# Patient Record
Sex: FEMALE | Race: WHITE | NOT HISPANIC OR LATINO | Employment: STUDENT | URBAN - METROPOLITAN AREA
[De-identification: names, ages, dates, MRNs, and addresses within clinical notes are randomized per-mention and may not be internally consistent; named-entity substitution may affect disease eponyms.]

---

## 2023-02-01 ENCOUNTER — OFFICE VISIT (OUTPATIENT)
Dept: INTERNAL MEDICINE CLINIC | Facility: CLINIC | Age: 23
End: 2023-02-01

## 2023-02-01 VITALS
SYSTOLIC BLOOD PRESSURE: 118 MMHG | TEMPERATURE: 96.1 F | BODY MASS INDEX: 21.28 KG/M2 | WEIGHT: 132.4 LBS | OXYGEN SATURATION: 94 % | HEIGHT: 66 IN | HEART RATE: 59 BPM | DIASTOLIC BLOOD PRESSURE: 70 MMHG

## 2023-02-01 DIAGNOSIS — N91.1 SECONDARY AMENORRHEA: Primary | ICD-10-CM

## 2023-02-01 DIAGNOSIS — K90.41 GLUTEN INTOLERANCE: ICD-10-CM

## 2023-02-01 DIAGNOSIS — L70.9 ACNE, UNSPECIFIED ACNE TYPE: ICD-10-CM

## 2023-02-01 PROBLEM — N91.2 AMENORRHEA: Status: ACTIVE | Noted: 2018-07-01

## 2023-02-01 NOTE — PROGRESS NOTES
Assessment/Plan:    Problem List Items Addressed This Visit        Musculoskeletal and Integument    Acne     -previously treated with spironolactone  -consider topical agents in the future            Other    Secondary amenorrhea - Primary     -menarche at 16yo, irregular and very few periods since with LMP 2019   -reports prior workup was unrevealing, no records in her chart  -will repeat labs, obtain pelvic US  -refer to GYN  Relevant Orders    TSH, 3rd generation with Free T4 reflex    Prolactin    FSH and LH    Comprehensive metabolic panel    CBC    US pelvis complete non OB    Ambulatory Referral to Obstetrics / Gynecology    Gluten intolerance     -doing well with following gluten free diet  -pt defers diagnosis by biopsy at this time  -she is also deferring lab testing  -readdress in the future            Subjective:      Patient ID: Norberto Closs is a 25 y o  female  HPI  20yo female here as a new patient  She has been following a gluten free diet but not formally diagnosed with celiac  Other sister's have had gluten intolerance as well  Has had irregular periods  Menarche at 16yo, had one period once then again in 2016  Was regular for 3-5 months in 2018  Since then, had her period once in 2019, LMP was then  She was a , she is training for a marathon right now  Works out 1hr per day  She was tested in 2020 and was noted to be anemic and low in proteins which normalized on repeat testing  She denies energy changes, mood lability, no vaginal discharge  She is not sexually active  She is not up to date on PAP  Has FH thyroid disorder and gluten intolerance  Has cold intolerance  She was placed on spironolactone for acne but discontinued 1 5 years ago      The following portions of the patient's history were reviewed and updated as appropriate: allergies, current medications, past family history, past medical history, past social history, past surgical history and problem list       Current Outpatient Medications:   •  ASHWAGANDHA PO, Take by mouth in the morning, Disp: , Rfl:   •  Multiple Vitamins-Minerals (WOMENS MULTI PO), Take by mouth 4 (four) times a day, Disp: , Rfl:     Review of Systems   Constitutional: Negative for activity change, appetite change, fatigue and unexpected weight change  Gastrointestinal: Negative for abdominal pain, diarrhea and nausea  Endocrine: Positive for cold intolerance  Genitourinary: Positive for menstrual problem  Negative for difficulty urinating, pelvic pain, vaginal bleeding, vaginal discharge and vaginal pain  Skin: Negative for color change, rash and wound  Psychiatric/Behavioral: Negative for dysphoric mood  Objective:    /70 (BP Location: Left arm, Patient Position: Sitting)   Pulse 59   Temp (!) 96 1 °F (35 6 °C) (Tympanic)   Ht 5' 6" (1 676 m)   Wt 60 1 kg (132 lb 6 4 oz)   SpO2 94%   BMI 21 37 kg/m²      Physical Exam  Vitals reviewed  Constitutional:       General: She is not in acute distress  Appearance: She is normal weight  Neck:      Thyroid: No thyromegaly or thyroid tenderness  Cardiovascular:      Rate and Rhythm: Normal rate and regular rhythm  Pulses: Normal pulses  Heart sounds: Normal heart sounds  Pulmonary:      Effort: Pulmonary effort is normal  No respiratory distress  Breath sounds: Normal breath sounds  No wheezing  Abdominal:      General: Abdomen is flat  Bowel sounds are normal       Palpations: Abdomen is soft  Musculoskeletal:      Cervical back: Neck supple  Right lower leg: No edema  Left lower leg: No edema  Lymphadenopathy:      Cervical: No cervical adenopathy  Skin:     Coloration: Skin is not pale  Neurological:      Mental Status: She is alert and oriented to person, place, and time     Psychiatric:         Mood and Affect: Mood normal

## 2023-02-01 NOTE — ASSESSMENT & PLAN NOTE
-menarche at 16yo, irregular and very few periods since with LMP 2019   -reports prior workup was unrevealing, no records in her chart  -will repeat labs, obtain pelvic US  -refer to GYN

## 2023-02-01 NOTE — ASSESSMENT & PLAN NOTE
-doing well with following gluten free diet  -pt defers diagnosis by biopsy at this time  -she is also deferring lab testing  -readdress in the future

## 2023-02-05 LAB
ALBUMIN SERPL-MCNC: 4.6 G/DL (ref 3.6–5.1)
ALBUMIN/GLOB SERPL: 1.9 (CALC) (ref 1–2.5)
ALP SERPL-CCNC: 52 U/L (ref 31–125)
ALT SERPL-CCNC: 28 U/L (ref 6–29)
AST SERPL-CCNC: 50 U/L (ref 10–30)
BASOPHILS # BLD AUTO: 40 CELLS/UL (ref 0–200)
BASOPHILS NFR BLD AUTO: 1.3 %
BILIRUB SERPL-MCNC: 1.3 MG/DL (ref 0.2–1.2)
BUN SERPL-MCNC: 15 MG/DL (ref 7–25)
BUN/CREAT SERPL: ABNORMAL (CALC) (ref 6–22)
CALCIUM SERPL-MCNC: 9.5 MG/DL (ref 8.6–10.2)
CHLORIDE SERPL-SCNC: 103 MMOL/L (ref 98–110)
CO2 SERPL-SCNC: 30 MMOL/L (ref 20–32)
CREAT SERPL-MCNC: 0.79 MG/DL (ref 0.5–0.96)
EOSINOPHIL # BLD AUTO: 59 CELLS/UL (ref 15–500)
EOSINOPHIL NFR BLD AUTO: 1.9 %
ERYTHROCYTE [DISTWIDTH] IN BLOOD BY AUTOMATED COUNT: 11.6 % (ref 11–15)
FSH SERPL-ACNC: 5.8 MIU/ML
GFR/BSA.PRED SERPLBLD CYS-BASED-ARV: 108 ML/MIN/1.73M2
GLOBULIN SER CALC-MCNC: 2.4 G/DL (CALC) (ref 1.9–3.7)
GLUCOSE SERPL-MCNC: 81 MG/DL (ref 65–99)
HCT VFR BLD AUTO: 38 % (ref 35–45)
HGB BLD-MCNC: 13.5 G/DL (ref 11.7–15.5)
LH SERPL-ACNC: 2.3 MIU/ML
LYMPHOCYTES # BLD AUTO: 1048 CELLS/UL (ref 850–3900)
LYMPHOCYTES NFR BLD AUTO: 33.8 %
MCH RBC QN AUTO: 31.8 PG (ref 27–33)
MCHC RBC AUTO-ENTMCNC: 35.5 G/DL (ref 32–36)
MCV RBC AUTO: 89.6 FL (ref 80–100)
MONOCYTES # BLD AUTO: 270 CELLS/UL (ref 200–950)
MONOCYTES NFR BLD AUTO: 8.7 %
NEUTROPHILS # BLD AUTO: 1683 CELLS/UL (ref 1500–7800)
NEUTROPHILS NFR BLD AUTO: 54.3 %
PLATELET # BLD AUTO: 244 THOUSAND/UL (ref 140–400)
PMV BLD REES-ECKER: 10.3 FL (ref 7.5–12.5)
POTASSIUM SERPL-SCNC: 4.4 MMOL/L (ref 3.5–5.3)
PROLACTIN SERPL-MCNC: 5.6 NG/ML
PROT SERPL-MCNC: 7 G/DL (ref 6.1–8.1)
RBC # BLD AUTO: 4.24 MILLION/UL (ref 3.8–5.1)
SODIUM SERPL-SCNC: 140 MMOL/L (ref 135–146)
TSH SERPL-ACNC: 1.9 MIU/L
WBC # BLD AUTO: 3.1 THOUSAND/UL (ref 3.8–10.8)

## 2023-02-20 ENCOUNTER — HOSPITAL ENCOUNTER (OUTPATIENT)
Dept: RADIOLOGY | Facility: HOSPITAL | Age: 23
Discharge: HOME | End: 2023-02-20

## 2023-02-20 DIAGNOSIS — N91.1 SECONDARY AMENORRHEA: ICD-10-CM

## 2023-02-22 NOTE — PROGRESS NOTES
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  We discussed starting OCPs for menstrual regulation  Patient is not interested in taking any medications at this time  We will follow-up once blood work is returned and ultrasound has been resulted  Schedule for annual exam    Subjective    CC: Problem visit     Samson Strong is a 25 y o  female [address] for  irregular periods  Menarche at 15yo,with one cycle  She did not have another cycle again until  2016  Reports regular cycles  for 3-5 months in 2018  Since then, she had only one cycle in 2019, lasting 4-5 days typically, that was her last documented cycle  She was a  played soccer, she is training for a marathon right now  Runs daily 6 miles 3 x a week, and runs longer 4 time a week,, bike rides 1 x weekly    She was tested for amenorrhea in 2020 and was noted to be anemic which normalized on repeat testing  She denies pelvic pain, unusual vaginal discharge or odors  She is not sexually active  She has not had her 1st pap     Hx of Spironolactone for acne but discontinued 1 5 years ago  No LMP recorded  Past Medical History:   Diagnosis Date   • Allergic 2005   • Irregular periods 6/29/2015   • Personal history of multiple concussions     x2   • Sprained ankle      Past Surgical History:   Procedure Laterality Date   • ROOT CANAL     • WISDOM TOOTH EXTRACTION           There is no immunization history on file for this patient      Family History   Problem Relation Age of Onset   • Depression Mother    • Thyroid disease Mother    • Autoimmune disease Mother         breaks out in hives occasionally after stressful event   • Anxiety disorder Mother    • Diabetes Father         type 2   • Hypothyroidism Sister         Hashimotos   • Autoimmune disease Sister         potential gluten intolerance as well (never tested)   • Thyroid disease Maternal Grandmother    • Hearing loss Maternal Grandmother    • Prostate cancer Maternal Grandfather    • Stroke Paternal Grandmother    • Stroke Paternal Grandfather    • Stroke Paternal Uncle    • Breast cancer Neg Hx    • Colon cancer Neg Hx    • Ovarian cancer Neg Hx      Social History     Tobacco Use   • Smoking status: Never   • Smokeless tobacco: Never   Vaping Use   • Vaping Use: Never used   Substance Use Topics   • Alcohol use: Yes     Comment: not consistently; once every couple weeks   • Drug use: Never     No current outpatient medications on file  Patient Active Problem List    Diagnosis Date Noted   • Gluten intolerance 2023   • Secondary amenorrhea 2018   • Acne 2015   • Eczema 10/17/2014       Allergies   Allergen Reactions   • Dog Epithelium Allergy Skin Test Itching and Nasal Congestion   • Dust Mite Extract Allergic Rhinitis   • Gluten Meal - Food Allergy Diarrhea and GI Intolerance       OB History    Para Term  AB Living   0 0 0 0 0 0   SAB IAB Ectopic Multiple Live Births   0 0 0 0 0       Vitals:    23 1308   BP: 100/62   BP Location: Left arm   Patient Position: Sitting   Cuff Size: Standard   Weight: 59 9 kg (132 lb)   Height: 5' 6" (1 676 m)     Body mass index is 21 31 kg/m²  Review of Systems     Constitutional: Negative for chills, fatigue, fever, headaches, visual disturbances, and unexpected weight change  Respiratory: Negative for cough, & shortness of breath  Cardiovascular: Negative for chest pain       Gastrointestinal: Negative for Abd pain, nausea & vomiting, constipation and diarrhea     Genitourinary: Negative for difficulty urinating, dysuria, hematuria, dyspareunia, unusual vaginal bleeding or discharge  Skin: Negative skin changes    Physical Exam     Constitutional: Alert & Oriented x3, well-developed and well-nourished  No distress  HENT: Atraumatic, Normocephalic, Conjunctivae clear  Neck: Normal range of motion  Neck supple  No thyromegaly, mass, nodules or tenderness  Pulmonary: Effort normal    Abdominal: Soft   No tenderness or masses  Musculoskeletal: Normal ROM  Skin: Warm & Dry  Psychological: Normal mood, thought content, behavior & judgement

## 2023-02-22 NOTE — PATIENT INSTRUCTIONS
Amenorrhea   AMBULATORY CARE:   Amenorrhea  is the absence of menstruation (your monthly period)  Primary amenorrhea means your period did not start by age 12  This is usually because of a lack of reproductive organs, such as a uterus  Breasts and other signs of puberty that usually start to develop by age 15 do not develop  Secondary amenorrhea means you stopped having regular periods for at least 3 months or irregular periods for 6 months  Amenorrhea may be a sign of a serious medical problem that needs to be treated  Common symptoms include the following:   Hair growth on your face or over your breastbone, or bald patches    Acne    Pelvic pain    Headaches    Vision changes    Bruises or patches of dark skin    Call your doctor or gynecologist for any of the following: You notice changes in your menstrual cycle, such as periods that start and stop a few times  Your female child is 15, has not started menstruating, and is not developing signs of puberty  Your female child is 12 and has developed signs of puberty, but she has not started menstruating  You have questions or concerns about your condition or care  Treatment for amenorrhea  may include birth control pills to restart and regulate your periods  You may need medicines to treat medical conditions such as a thyroid or pituitary disorder, or PCOS  Surgery may fix a problem that is preventing blood from flowing through your vagina, or to remove a tumor  Prevent or manage amenorrhea:   Maintain a healthy weight  Low body weight, overweight, or obesity can all affect your period  Your healthcare provider can help you create a plan to reach and maintain a healthy weight  Eat healthy foods  Healthy foods include fruits, vegetables, whole-grain breads, low-fat dairy products, beans, lean meats, and fish  You may need to have more calcium and vitamin D if your amenorrhea is caused by low estrogen levels   Low estrogen can affect bone strength  Calcium and vitamin D work together to help build bone  Your healthcare provider or a dietitian can help you create a meal plan that has the right number of calories and nutrients for you  Exercise as directed  Exercise can help you build or maintain bone  Exercise can also help you lose weight if you are overweight  Ask your healthcare provider how much to exercise and which exercises are best for you  Do not exercise more than your healthcare provider recommends  Too much exercise can cause your period to stop  Keep a record of your monthly periods  Record the dates your period started and stopped  Also record any pain or other problems during your period  Manage stress  Try new ways to relax, such as deep breathing  Ask your healthcare provider for more information on stress management  Follow up with your doctor or gynecologist as directed:  Write down your questions so you remember to ask them during your visits  © Copyright [employer] 2022 Information is for End User's use only and may not be sold, redistributed or otherwise used for commercial purposes  The above information is an  only  It is not intended as medical advice for individual conditions or treatments  Talk to your doctor, nurse or pharmacist before following any medical regimen to see if it is safe and effective for you  Female Athlete Triad   WHAT YOU NEED TO KNOW:   What is the female athlete triad? The female athlete triad is a combination of 3 health problems  These health problems include disordered eating, loss of monthly period, and low bone density  The condition occurs when a female athlete does intense training or exercise and has a strict diet  What increases my risk for the female athlete triad?    Not eating enough, fasting for long periods of time, following a strict diet    An eating disorder such as anorexia or bulimia    Pressure from coaches or parents to be thin or lose weight    Lack of social support because your training schedule keeps you from your friends or family    Conditions including anxiety, depression, or obsessive compulsive disorder    What are the signs and symptoms of the female athlete triad? Irregular or no monthly periods    Dry, cracked skin, and thinning hair    Large amount of weight loss    Scars or callouses on your hands or knuckles caused by forcing yourself to throw up    Feeling more tired than usual    Frequent injuries such as stress fractures    Trouble concentrating or mood changes    How is the female athlete triad diagnosed? Your healthcare provider will ask you questions about your eating behavior, weight changes, exercise habits, monthly periods, and injuries  He or she may ask how you feel about yourself and about the way you look  You may also need the following:  Blood and urine tests  may show low amount of hormones or electrolytes in your body  A bone density scan  will show if you have weak or thinning bones  An EKG  will show your heart rhythm  How is the female athlete triad treated? A dietitian  can help you with a meal plan and proper nutrition  He or she can discuss healthy eating habits and how to help you balance food and your sport  You may need to take extra vitamins or minerals  Medicines  may be needed to make your bones stronger, treat an abnormal heartbeat, or treat depression  Cognitive behavioral therapy (CBT)  will help you identify any negative feelings and behaviors about food and your weight  You and a therapist work together to learn the reasons you are unhappy with your body  Talk therapy  is a type of counseling that is usually done in a series of meetings or talks  You, your family members, coaches, teammates, or people who are close to you may also attend  How can I prevent the female athlete triad? Ask for help at any time    Talk to your , trainers, friends, or family if you have problems with your health  Try not to compare yourself to others  Focus on your own ideal body weight and sports performance  Try to choose friends or role models with healthy body images and eating habits  Coaches, trainers, teammates, and family members should not pressure you to diet and lose weight  Eat healthy foods  Healthy foods may help you feel better and have more energy  Eat foods that are high in calcium, iron, and protein  Calcium is found in dairy products such as milk, cheese, and yogurt  Protein and iron are found in chicken, fish, meat, and beans  Include fruit, colorful vegetables, and whole grains  Do not skip meals and snacks  Monitor your weight and monthly period  You or a healthcare provider may need to check your weight regularly  Keep track of your menstrual periods so that you can check the number of days between cycles  Call your local emergency number [phone] in the [address] [facility] [facility] or have someone call if:   You have a seizure  You have trouble breathing, chest pain, or a fast heartbeat  You feel like hurting yourself  When should I call my doctor? You feel you cannot cope at home, work, or school  Your symptoms are getting worse  You have questions or concerns about your condition or care  CARE AGREEMENT:   You have the right to help plan your care  Learn about your health condition and how it may be treated  Discuss treatment options with your healthcare providers to decide what care you want to receive  You always have the right to refuse treatment  The above information is an  only  It is not intended as medical advice for individual conditions or treatments  Talk to your doctor, nurse or pharmacist before following any medical regimen to see if it is safe and effective for you  © Copyright [employer] 2022 Information is for End User's use only and may not be sold, redistributed or otherwise used for commercial purposes

## 2023-02-23 ENCOUNTER — OFFICE VISIT (OUTPATIENT)
Dept: OBGYN CLINIC | Facility: CLINIC | Age: 23
End: 2023-02-23

## 2023-02-23 VITALS
WEIGHT: 132 LBS | SYSTOLIC BLOOD PRESSURE: 100 MMHG | BODY MASS INDEX: 21.21 KG/M2 | DIASTOLIC BLOOD PRESSURE: 62 MMHG | HEIGHT: 66 IN

## 2023-02-23 DIAGNOSIS — N91.1 SECONDARY AMENORRHEA: ICD-10-CM

## 2023-02-23 DIAGNOSIS — N91.2 AMENORRHEA: Primary | ICD-10-CM

## 2023-07-30 ENCOUNTER — OFFICE VISIT (OUTPATIENT)
Dept: URGENT CARE | Age: 23
End: 2023-07-30
Payer: COMMERCIAL

## 2023-07-30 ENCOUNTER — APPOINTMENT (OUTPATIENT)
Dept: RADIOLOGY | Age: 23
End: 2023-07-30
Payer: COMMERCIAL

## 2023-07-30 VITALS
DIASTOLIC BLOOD PRESSURE: 66 MMHG | SYSTOLIC BLOOD PRESSURE: 115 MMHG | HEART RATE: 79 BPM | OXYGEN SATURATION: 99 % | HEIGHT: 67 IN | RESPIRATION RATE: 16 BRPM | BODY MASS INDEX: 21.03 KG/M2 | WEIGHT: 134 LBS

## 2023-07-30 DIAGNOSIS — T14.8XXA ABRASION: ICD-10-CM

## 2023-07-30 DIAGNOSIS — S49.91XA INJURY OF RIGHT SHOULDER, INITIAL ENCOUNTER: ICD-10-CM

## 2023-07-30 DIAGNOSIS — S49.91XA INJURY OF RIGHT SHOULDER, INITIAL ENCOUNTER: Primary | ICD-10-CM

## 2023-07-30 DIAGNOSIS — S42.024A CLOSED NONDISPLACED FRACTURE OF SHAFT OF RIGHT CLAVICLE, INITIAL ENCOUNTER: ICD-10-CM

## 2023-07-30 DIAGNOSIS — S51.011A LACERATION OF RIGHT ELBOW, INITIAL ENCOUNTER: ICD-10-CM

## 2023-07-30 PROCEDURE — 73030 X-RAY EXAM OF SHOULDER: CPT

## 2023-07-30 PROCEDURE — 99213 OFFICE O/P EST LOW 20 MIN: CPT

## 2023-07-30 PROCEDURE — 73000 X-RAY EXAM OF COLLAR BONE: CPT

## 2023-07-30 PROCEDURE — 12002 RPR S/N/AX/GEN/TRNK2.6-7.5CM: CPT

## 2023-07-30 RX ORDER — CEPHALEXIN 500 MG/1
500 CAPSULE ORAL EVERY 6 HOURS SCHEDULED
Qty: 28 CAPSULE | Refills: 0 | Status: SHIPPED | OUTPATIENT
Start: 2023-07-30 | End: 2023-08-06

## 2023-07-30 NOTE — PATIENT INSTRUCTIONS
X-rays reviewed, suspect fracture of right clavicle shaft, awaiting official read. Sling applied in office. Take frequent breaks from sling to prevent frozen shoulder. Return for suture removal in 7-10 days, monitor for signs of infection such as redness,swelling, pus and fever. Apply ointment such as Vaseline, Aquafor liberally to abrasions and keep covered.
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
## 2023-08-08 ENCOUNTER — OFFICE VISIT (OUTPATIENT)
Dept: INTERNAL MEDICINE CLINIC | Facility: CLINIC | Age: 23
End: 2023-08-08
Payer: COMMERCIAL

## 2023-08-08 VITALS
HEART RATE: 74 BPM | HEIGHT: 67 IN | RESPIRATION RATE: 16 BRPM | WEIGHT: 127 LBS | TEMPERATURE: 98 F | OXYGEN SATURATION: 99 % | SYSTOLIC BLOOD PRESSURE: 98 MMHG | DIASTOLIC BLOOD PRESSURE: 80 MMHG | BODY MASS INDEX: 19.93 KG/M2

## 2023-08-08 DIAGNOSIS — Z00.00 ANNUAL PHYSICAL EXAM: Primary | ICD-10-CM

## 2023-08-08 DIAGNOSIS — S42.024D CLOSED NONDISPLACED FRACTURE OF SHAFT OF RIGHT CLAVICLE WITH ROUTINE HEALING, SUBSEQUENT ENCOUNTER: ICD-10-CM

## 2023-08-08 PROBLEM — S42.024A CLOSED NONDISPLACED FRACTURE OF SHAFT OF RIGHT CLAVICLE: Status: ACTIVE | Noted: 2023-08-08

## 2023-08-08 PROCEDURE — 99395 PREV VISIT EST AGE 18-39: CPT | Performed by: INTERNAL MEDICINE

## 2023-08-08 NOTE — PATIENT INSTRUCTIONS
Wellness Visit for Adults   AMBULATORY CARE:   A wellness visit  is when you see your healthcare provider to get screened for health problems. Your healthcare provider will also give you advice on how to stay healthy. Write down your questions so you remember to ask them. Ask your healthcare provider how often you should have a wellness visit. What happens at a wellness visit:  Your healthcare provider will ask about your health, and your family history of health problems. This includes high blood pressure, heart disease, and cancer. He or she will ask if you have symptoms that concern you, if you smoke, and about your mood. You may also be asked about your intake of medicines, supplements, food, and alcohol. Any of the following may be done:  • Your weight  will be checked. Your height may also be checked so your body mass index (BMI) can be calculated. Your BMI shows if you are at a healthy weight. • Your blood pressure  and heart rate will be checked. Your temperature may also be checked. • Blood and urine tests  may be done. Blood tests may be done to check your cholesterol levels. Abnormal cholesterol levels increase your risk for heart disease and stroke. You may also need a blood or urine test to check for diabetes if you are at increased risk. Urine tests may be done to look for signs of an infection or kidney disease. • A physical exam  includes checking your heartbeat and lungs with a stethoscope. Your healthcare provider may also check your skin to look for sun damage. • Screening tests  may be recommended. A screening test is done to check for diseases that may not cause symptoms. The screening tests you may need depend on your age, gender, family history, and lifestyle habits. For example, colorectal screening may be recommended if you are 48years old or older. Screening tests you need if you are a woman:   • A Pap smear  is used to screen for cervical cancer.  Pap smears are usually done every 3 to 5 years depending on your age. You may need them more often if you have had abnormal Pap smear test results in the past. Ask your healthcare provider how often you should have a Pap smear. • A mammogram  is an x-ray of your breasts to screen for breast cancer. Experts recommend mammograms every 2 years starting at age 48 years. You may need a mammogram at age 52 years or younger if you have an increased risk for breast cancer. Talk to your healthcare provider about when you should start having mammograms and how often you need them. Vaccines you may need:   • Get an influenza vaccine  every year. The influenza vaccine protects you from the flu. Several types of viruses cause the flu. The viruses change over time, so new vaccines are made each year. • Get a tetanus-diphtheria (Td) booster vaccine  every 10 years. This vaccine protects you against tetanus and diphtheria. Tetanus is a severe infection that may cause painful muscle spasms and lockjaw. Diphtheria is a severe bacterial infection that causes a thick covering in the back of your mouth and throat. • Get a human papillomavirus (HPV) vaccine  if you are female and aged 23 to 32 or male 23 to 24 and never received it. This vaccine protects you from HPV infection. HPV is the most common infection spread by sexual contact. HPV may also cause vaginal, penile, and anal cancers. • Get a pneumococcal vaccine  if you are aged 72 years or older. The pneumococcal vaccine is an injection given to protect you from pneumococcal disease. Pneumococcal disease is an infection caused by pneumococcal bacteria. The infection may cause pneumonia, meningitis, or an ear infection. • Get a shingles vaccine  if you are 60 or older, even if you have had shingles before. The shingles vaccine is an injection to protect you from the varicella-zoster virus. This is the same virus that causes chickenpox.  Shingles is a painful rash that develops in people who had chickenpox or have been exposed to the virus. How to eat healthy:  My Plate is a model for planning healthy meals. It shows the types and amounts of foods that should go on your plate. Fruits and vegetables make up about half of your plate, and grains and protein make up the other half. A serving of dairy is included on the side of your plate. The amount of calories and serving sizes you need depends on your age, gender, weight, and height. Examples of healthy foods are listed below:  • Eat a variety of vegetables  such as dark green, red, and orange vegetables. You can also include canned vegetables low in sodium (salt) and frozen vegetables without added butter or sauces. • Eat a variety of fresh fruits , canned fruit in 100% juice, frozen fruit, and dried fruit. • Include whole grains. At least half of the grains you eat should be whole grains. Examples include whole-wheat bread, wheat pasta, brown rice, and whole-grain cereals such as oatmeal.    • Eat a variety of protein foods such as seafood (fish and shellfish), lean meat, and poultry without skin (turkey and chicken). Examples of lean meats include pork leg, shoulder, or tenderloin, and beef round, sirloin, tenderloin, and extra lean ground beef. Other protein foods include eggs and egg substitutes, beans, peas, soy products, nuts, and seeds. • Choose low-fat dairy products such as skim or 1% milk or low-fat yogurt, cheese, and cottage cheese. • Limit unhealthy fats  such as butter, hard margarine, and shortening. Exercise:  Exercise at least 30 minutes per day on most days of the week. Some examples of exercise include walking, biking, dancing, and swimming. You can also fit in more physical activity by taking the stairs instead of the elevator or parking farther away from stores. Include muscle strengthening activities 2 days each week. Regular exercise provides many health benefits.  It helps you manage your weight, and decreases your risk for type 2 diabetes, heart disease, stroke, and high blood pressure. Exercise can also help improve your mood. Ask your healthcare provider about the best exercise plan for you. General health and safety guidelines:   • Do not smoke. Nicotine and other chemicals in cigarettes and cigars can cause lung damage. Ask your healthcare provider for information if you currently smoke and need help to quit. E-cigarettes or smokeless tobacco still contain nicotine. Talk to your healthcare provider before you use these products. • Limit alcohol. A drink of alcohol is 12 ounces of beer, 5 ounces of wine, or 1½ ounces of liquor. • Lose weight, if needed. Being overweight increases your risk of certain health conditions. These include heart disease, high blood pressure, type 2 diabetes, and certain types of cancer. • Protect your skin. Do not sunbathe or use tanning beds. Use sunscreen with a SPF 15 or higher. Apply sunscreen at least 15 minutes before you go outside. Reapply sunscreen every 2 hours. Wear protective clothing, hats, and sunglasses when you are outside. • Drive safely. Always wear your seatbelt. Make sure everyone in your car wears a seatbelt. A seatbelt can save your life if you are in an accident. Do not use your cell phone when you are driving. This could distract you and cause an accident. Pull over if you need to make a call or send a text message. • Practice safe sex. Use latex condoms if are sexually active and have more than one partner. Your healthcare provider may recommend screening tests for sexually transmitted infections (STIs). • Wear helmets, lifejackets, and protective gear. Always wear a helmet when you ride a bike or motorcycle, go skiing, or play sports that could cause a head injury. Wear protective equipment when you play sports. Wear a lifejacket when you are on a boat or doing water sports.     © Copyright [employer] 2022 Information is for End User's use only and may not be sold, redistributed or otherwise used for commercial purposes. The above information is an  only. It is not intended as medical advice for individual conditions or treatments. Talk to your doctor, nurse or pharmacist before following any medical regimen to see if it is safe and effective for you. Cholesterol and Your Health   AMBULATORY CARE:   Cholesterol  is a waxy, fat-like substance. Your body uses cholesterol to make hormones and new cells, and to protect nerves. Cholesterol is made by your body. It also comes from certain foods you eat, such as meat and dairy products. Your healthcare provider can help you set goals for your cholesterol levels. He or she can help you create a plan to meet your goals. Cholesterol level goals: Your cholesterol level goals depend on your risk for heart disease, your age, and your other health conditions. The following are general guidelines:  • Total cholesterol  includes low-density lipoprotein (LDL), high-density lipoprotein (HDL), and triglyceride levels. The total cholesterol level should be lower than 200 mg/dL and is best at about 150 mg/dL. • LDL cholesterol  is called bad cholesterol  because it forms plaque in your arteries. As plaque builds up, your arteries become narrow, and less blood flows through. When plaque decreases blood flow to your heart, you may have chest pain. If plaque completely blocks an artery that brings blood to your heart, you may have a heart attack. Plaque can break off and form blood clots. Blood clots may block arteries in your brain and cause a stroke. The level should be less than 130 mg/dL and is best at about 100 mg/dL. • HDL cholesterol  is called good cholesterol  because it helps remove LDL cholesterol from your arteries. It does this by attaching to LDL cholesterol and carrying it to your liver. Your liver breaks down LDL cholesterol so your body can get rid of it.  High levels of HDL cholesterol can help prevent a heart attack and stroke. Low levels of HDL cholesterol can increase your risk for heart disease, heart attack, and stroke. The level should be 60 mg/dL or higher. • Triglycerides  are a type of fat that store energy from foods you eat. High levels of triglycerides also cause plaque buildup. This can increase your risk for a heart attack or stroke. If your triglyceride level is high, your LDL cholesterol level may also be high. The level should be less than 150 mg/dL. Any of the following can increase your risk for high cholesterol:   • Smoking cigarettes    • Being overweight or obese, or not getting enough exercise    • Drinking large amounts of alcohol    • A medical condition such as hypertension (high blood pressure) or diabetes    • Certain genes passed from your parents to you    • Age older than 65 years    What you need to know about having your cholesterol levels checked: Adults 21to 39years of age should have their cholesterol levels checked every 4 to 6 years. Adults 45 years or older should have their cholesterol checked every 1 to 2 years. You may need your cholesterol checked more often, or at a younger age, if you have risk factors for heart disease. You may also need to have your cholesterol checked more often if you have other health conditions, such as diabetes. Blood tests are used to check cholesterol levels. Blood tests measure your levels of triglycerides, LDL cholesterol, and HDL cholesterol. How healthy fats affect your cholesterol levels:  Healthy fats, also called unsaturated fats, help lower LDL cholesterol and triglyceride levels. Healthy fats include the following:  • Monounsaturated fats  are found in foods such as olive oil, canola oil, avocado, nuts, and olives. • Polyunsaturated fats,  such as omega 3 fats, are found in fish, such as salmon, trout, and tuna.  They can also be found in plant foods such as flaxseed, walnuts, and soybeans. How unhealthy fats affect your cholesterol levels:  Unhealthy fats increase LDL cholesterol and triglyceride levels. They are found in foods high in cholesterol, saturated fat, and trans fat:  • Cholesterol  is found in eggs, dairy, and meat. • Saturated fat  is found in butter, cheese, ice cream, whole milk, and coconut oil. Saturated fat is also found in meat, such as sausage, hot dogs, and bologna. • Trans fat  is found in liquid oils and is used in fried and baked foods. Foods that contain trans fats include chips, crackers, muffins, sweet rolls, microwave popcorn, and cookies. Treatment  for high cholesterol will also decrease your risk of heart disease, heart attack, and stroke. Treatment may include any of the following:  • Lifestyle changes  may include food, exercise, weight loss, and quitting smoking. You may also need to decrease the amount of alcohol you drink. Your healthcare provider will want you to start with lifestyle changes. Other treatment may be added if lifestyle changes are not enough. Your healthcare provider may recommend you work with a team to manage hyperlipidemia. The team may include medical experts such as a dietitian, an exercise or physical therapist, and a behavior therapist. Your family members may be included in helping you create lifestyle changes. • Medicines  may be given to lower your LDL cholesterol, triglyceride levels, or total cholesterol level. You may need medicines to lower your cholesterol if any of the following is true:    ? You have a history of stroke, TIA, unstable angina, or a heart attack. ? Your LDL cholesterol level is 190 mg/dL or higher. ? You are age 36 to 76 years, have diabetes or heart disease risk factors, and your LDL cholesterol is 70 mg/dL or higher. • Supplements  include fish oil, red yeast rice, and garlic. Fish oil may help lower your triglyceride and LDL cholesterol levels.  It may also increase your HDL cholesterol level. Red yeast rice may help decrease your total cholesterol level and LDL cholesterol level. Garlic may help lower your total cholesterol level. Do not take any supplements without talking to your healthcare provider. Food changes you can make to lower your cholesterol levels:  A dietitian can help you create a healthy eating plan. He or she can show you how to read food labels and choose foods low in saturated fat, trans fats, and cholesterol. • Decrease the total amount of fat you eat. Choose lean meats, fat-free or 1% fat milk, and low-fat dairy products, such as yogurt and cheese. Try to limit or avoid red meats. Limit or do not eat fried foods or baked goods, such as cookies. • Replace unhealthy fats with healthy fats. Cook foods in olive oil or canola oil. Choose soft margarines that are low in saturated fat and trans fat. Seeds, nuts, and avocados are other examples of healthy fats. • Eat foods with omega-3 fats. Examples include salmon, tuna, mackerel, walnuts, and flaxseed. Eat fish 2 times per week. Pregnant women should not eat fish that have high levels of mercury, such as shark, swordfish, and zaina mackerel. • Increase the amount of high-fiber foods you eat. High-fiber foods can help lower your LDL cholesterol. Aim to get between 20 and 30 grams of fiber each day. Fruits and vegetables are high in fiber. Eat at least 5 servings each day. Other high-fiber foods are whole-grain or whole-wheat breads, pastas, or cereals, and brown rice. Eat 3 ounces of whole-grain foods each day. Increase fiber slowly. You may have abdominal discomfort, bloating, and gas if you add fiber to your diet too quickly. • Eat healthy protein foods. Examples include low-fat dairy products, skinless chicken and turkey, fish, and nuts. • Limit foods and drinks that are high in sugar.   Your dietitian or healthcare provider can help you create daily limits for high-sugar foods and drinks. The limit may be lower if you have diabetes or another health condition. Limits can also help you lose weight if needed. Lifestyle changes you can make to lower your cholesterol levels:   • Maintain a healthy weight. Ask your healthcare provider what a healthy weight is for you. Ask him or her to help you create a weight loss plan if needed. Weight loss can decrease your total cholesterol and triglyceride levels. Weight loss may also help keep your blood pressure at a healthy level. • Be physically active throughout the day. Physical activity, such as exercise, can help lower your total cholesterol level and maintain a healthy weight. Physical activity can also help increase your HDL cholesterol level. Work with your healthcare provider to create an program that is right for you. Get at least 30 to 40 minutes of moderate physical activity most days of the week. Examples of exercise include brisk walking, swimming, or biking. Also include strength training at least 2 times each week. Your healthcare providers can help you create a physical activity plan. • Do not smoke. Nicotine and other chemicals in cigarettes and cigars can raise your cholesterol levels. Ask your healthcare provider for information if you currently smoke and need help to quit. E-cigarettes or smokeless tobacco still contain nicotine. Talk to your healthcare provider before you use these products. • Limit or do not drink alcohol. Alcohol can increase your triglyceride levels. Ask your healthcare provider before you drink alcohol. Ask how much is okay for you to drink in 24 hours or 1 week. Follow up with your doctor as directed:  Write down your questions so you remember to ask them during your visits. © Copyright [employer] 2022 Information is for End User's use only and may not be sold, redistributed or otherwise used for commercial purposes. The above information is an  only.  It is not intended as medical advice for individual conditions or treatments. Talk to your doctor, nurse or pharmacist before following any medical regimen to see if it is safe and effective for you.
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
## 2023-08-22 ENCOUNTER — APPOINTMENT (OUTPATIENT)
Dept: RADIOLOGY | Age: 23
End: 2023-08-22
Payer: COMMERCIAL

## 2023-08-22 ENCOUNTER — OFFICE VISIT (OUTPATIENT)
Dept: OBGYN CLINIC | Facility: CLINIC | Age: 23
End: 2023-08-22
Payer: COMMERCIAL

## 2023-08-22 VITALS
BODY MASS INDEX: 19.93 KG/M2 | DIASTOLIC BLOOD PRESSURE: 58 MMHG | HEART RATE: 61 BPM | SYSTOLIC BLOOD PRESSURE: 93 MMHG | WEIGHT: 127 LBS | HEIGHT: 67 IN

## 2023-08-22 DIAGNOSIS — S42.024A CLOSED NONDISPLACED FRACTURE OF SHAFT OF RIGHT CLAVICLE, INITIAL ENCOUNTER: ICD-10-CM

## 2023-08-22 PROCEDURE — 99203 OFFICE O/P NEW LOW 30 MIN: CPT | Performed by: ORTHOPAEDIC SURGERY

## 2023-08-22 PROCEDURE — 73000 X-RAY EXAM OF COLLAR BONE: CPT
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
## 2023-09-08 NOTE — PROGRESS NOTES
Diagnoses and all orders for this visit:    Encounter for gynecological examination without abnormal finding  -     Liquid-based pap, screening  -     Follicle stimulating hormone; Future    Amenorrhea  -     Follicle stimulating hormone; Future  -     Antimullerian hormone (AMH); Future        Perineal hygiene reviewed   Weight bearing exercises minium of 150 mins/weekly advised. Kegel exercises recommended. SBE encouraged, ASCCP guidelines reviewed. Condoms encouraged with all sexual activity to prevent STI's. Gardisil vaccines recommended up to age 39  Calcium/ Vit D dietary requirements discussed,   Advised to call with any issues,  all concerns & questions addressed. See provided information in your after visit summary     F/U Annually and PRN      Health Maintenance:    First PAP: collected today 09/14/2023    Gardisil: Completed / Not completed       Subjective    CC: Yearly Exam      Krissy Rush is a 21 y.o. female here for an annual exam. 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 BMI is within normal range, labs were ordered at the beginning of this year and not completed, 2 additional labs added, patient was encouraged to have these labs completed at this time so we can further assess for amenorrheic status. Patient had a pelvic ultrasound that was negative 2/2023  We have discussed starting an OCP in the past to help regulate menstrual cycles. Patient is not interested at this time. No personal Hx of breast, cervical, ovarian or colon CA. Family hx of:  No GYN cancers  Medically stable, reports no changes in medical Hx, follows with PMD    Patient's last menstrual period was 06/10/2023 (exact date). Her menstrual cycles are rare. She denies issues with bleeding during her menses.    She denies breast concerns, abnormal vaginal discharge, vaginal itching, odor, irritation, bowel/bladder dysfunction, urinary symptoms, pelvic pain, or dyspareunia today. She is not sexually active yet with her partner. Monogamous relationship. Her current method of contraception includes none. Denies any issues with her BCM. They will use natural family planning once they become sexually active. I advised condoms to prevent unintended pregnancy  She does not want STD testing today. 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     Family History   Problem Relation Age of Onset   • Depression Mother    • Thyroid disease Mother    • Autoimmune disease Mother         breaks out in hives occasionally after stressful event   • Anxiety disorder Mother    • Diabetes Father         type 2   • Hypothyroidism Sister         Hashimotos   • Autoimmune disease Sister         potential gluten intolerance as well (never tested)   • No Known Problems Sister    • No Known Problems Sister    • No Known Problems Brother    • Thyroid disease Maternal Grandmother    • Hearing loss Maternal Grandmother    • Prostate cancer Maternal Grandfather    • Stroke Paternal Grandmother    • Stroke Paternal Grandfather    • Stroke Paternal Uncle    • Breast cancer Neg Hx    • Colon cancer Neg Hx    • Ovarian cancer Neg Hx      Social History     Tobacco Use   • Smoking status: Never   • Smokeless tobacco: Never   Vaping Use   • Vaping Use: Never used   Substance Use Topics   • Alcohol use: Yes     Comment: not consistently; once every couple weeks   • Drug use: Never     No current outpatient medications on file. Patient Active Problem List    Diagnosis Date Noted   • Closed nondisplaced fracture of shaft of right clavicle 2023   • Gluten intolerance 2023   • Secondary amenorrhea 2018   • Acne 2015   • Eczema 10/17/2014       Allergies   Allergen Reactions   • Dog Epithelium Allergy Skin Test Itching and Nasal Congestion   • Dust Mite Extract Allergic Rhinitis   • Gluten Meal - Food Allergy Diarrhea and GI Intolerance       OB History    Para Term  AB Living   0 0 0 0 0 0   SAB IAB Ectopic Multiple Live Births   0 0 0 0 0       Vitals:    23 0956   BP: 120/70   BP Location: Left arm   Patient Position: Sitting   Cuff Size: Large   Weight: 59 kg (130 lb)   Height: 5' 7" (1.702 m)     Body mass index is 20.36 kg/m². Review of Systems     Constitutional: Negative for chills, fatigue, fever, headaches, visual disturbances, and unexpected weight change. Respiratory: Negative for cough, & shortness of breath. Cardiovascular: Negative for chest pain. .    Gastrointestinal: Negative for Abd pain, nausea & vomiting, constipation and diarrhea. Genitourinary: Negative for difficulty urinating, dysuria, hematuria, dyspareunia, unusual vaginal bleeding or discharge  Skin: Negative skin changes    Physical Exam     Constitutional: Alert & Oriented x3, well-developed and well-nourished. No distress. HENT: Atraumatic, Normocephalic, Conjunctivae clear  Neck: Normal range of motion. Neck supple. No thyromegaly, mass, nodules or tenderness  Pulmonary: Effort normal.   Abdominal: Soft. No tenderness or masses  Musculoskeletal: Normal ROM  Skin: Warm & Dry  Psychological: Normal mood, thought content, behavior & judgement     Breasts:   Right: tissue soft without masses, tenderness, skin changes or nipple discharge. No areas of erythema or pain. No subclavicular, axillary, pectoral adenopathy  Left:  tissue soft without masses, tenderness, skin changes or nipple discharge.  No areas of erythema or pain. No subclavicular, axillary, pectoral adenopathy    Pelvic exam was performed with patient supine, lithotomy position. Labia: Negative rash, tenderness, lesion or injury on the right labia. Negative rash, tenderness, lesion or injury on the left labia. Urethral meatus:  Negative for  tenderness, inflammation or discharge. Uterus: not deviated, enlarged, fixed or tender. Cervix: No CMT, no discharge or friability. Right adnexa: no mass, no tenderness and no fullness. Left adnexa: no mass, no tenderness and no fullness. Vagina: No erythema, tenderness, masses, or foreign body in the vagina. No signs of injury around the vagina. No unusual vaginal discharge   Perineum without lesions, signs of injury, erythema or swelling. Inguinal Canal:        Right: No inguinal adenopathy or hernia present. Left: No inguinal adenopathy or hernia present.      OBGyn Exam

## 2023-09-08 NOTE — PATIENT INSTRUCTIONS
Breast Self Exam for Women   AMBULATORY CARE:   A breast self-exam (BSE)  is a way to check your breasts for lumps and other changes. Regular BSEs can help you know how your breasts normally look and feel. Most breast lumps or changes are not cancer, but you should always have them checked by a healthcare provider. Why you should do a BSE:  Breast cancer is the most common type of cancer in women. Even if you have mammograms, you may still want to do a BSE regularly. If you know how your breasts normally feel and look, it may help you know when to contact your healthcare provider. Mammograms can miss some cancers. You may find a lump during a BSE that did not show up on a mammogram.  When you should do a BSE:  If you have periods, you may want to do your BSE 1 week after your period ends. This is the time when your breasts may be the least swollen, lumpy, or tender. You can do regular BSEs even if you are breastfeeding or have breast implants. Call your doctor if:   You find any lumps or changes in your breasts. You have breast pain or fluid coming from your nipples. You have questions or concerns about your condition or care. How to do a BSE:       Look at your breasts in a mirror. Look at the size and shape of each breast and nipple. Check for swelling, lumps, dimpling, scaly skin, or other skin changes. Look for nipple changes, such as a nipple that is painful or beginning to pull inward. Gently squeeze both nipples and check to see if fluid (that is not breast milk) comes out of them. If you find any of these or other breast changes, contact your healthcare provider. Check your breasts while you sit or  the following 3 positions:    [place] your arms down at your sides. Raise your hands and join them behind your head. Put firm pressure with your hands on your hips. Bend slightly forward while you look at your breasts in the mirror. Lie down and feel your breasts.   When you lie down, your breast tissue spreads out evenly over your chest. This makes it easier for you to feel for lumps and anything that may not be normal for your breasts. Do a BSE on one breast at a time. Place a small pillow or towel under your left shoulder. Put your left arm behind your head. Use the 3 middle fingers of your right hand. Use your fingertip pads, on the top of your fingers. Your fingertip pad is the most sensitive part of your finger. Use small circles to feel your breast tissue. Use your fingertip pads to make dime-sized, overlapping circles on your breast and armpits. Use light, medium, and firm pressure. First, press lightly. Second, press with medium pressure to feel a little deeper into the breast. Last, use firm pressure to feel deep within your breast.    Examine your entire breast area. Examine the breast area from above the breast to below the breast where you feel only ribs. Make small circles with your fingertips, starting in the middle of your armpit. Make circles going up and down the breast area. Continue toward your breast and all the way across it. Examine the area from your armpit all the way over to the middle of your chest (breastbone). Stop at the middle of your chest.    Move the pillow or towel to your right shoulder, and put your right arm behind your head. Use the 3 fingertip pads of your left hand, and repeat the above steps to do a BSE on your right breast.  What else you can do to check for breast problems or cancer:  Talk to your healthcare provider about mammograms. A mammogram is an x-ray of your breasts to screen for breast cancer or other problems. Your provider can tell you the benefits and risks of mammograms. The first mammogram is usually at age 39 or 48. Your provider may recommend you start at 36 or younger if your risk for breast cancer is high. Mammograms usually continue every 1 to 2 years until age 76.        Follow up with your doctor as directed:  Write down your questions so you remember to ask them during your visits. © Copyright Shea Payne 2022 Information is for End User's use only and may not be sold, redistributed or otherwise used for commercial purposes. The above information is an  only. It is not intended as medical advice for individual conditions or treatments. Talk to your doctor, nurse or pharmacist before following any medical regimen to see if it is safe and effective for you. Wellness Visit for Adults   AMBULATORY CARE:   A wellness visit  is when you see your healthcare provider to get screened for health problems. Your healthcare provider will also give you advice on how to stay healthy. Write down your questions so you remember to ask them. Ask your healthcare provider how often you should have a wellness visit. What happens at a wellness visit:  Your healthcare provider will ask about your health, and your family history of health problems. This includes high blood pressure, heart disease, and cancer. He or she will ask if you have symptoms that concern you, if you smoke, and about your mood. You may also be asked about your intake of medicines, supplements, food, and alcohol. Any of the following may be done: Your weight  will be checked. Your height may also be checked so your body mass index (BMI) can be calculated. Your BMI shows if you are at a healthy weight. Your blood pressure  and heart rate will be checked. Your temperature may also be checked. Blood and urine tests  may be done. Blood tests may be done to check your cholesterol levels. Abnormal cholesterol levels increase your risk for heart disease and stroke. You may also need a blood or urine test to check for diabetes if you are at increased risk. Urine tests may be done to look for signs of an infection or kidney disease. A physical exam  includes checking your heartbeat and lungs with a stethoscope.  Your healthcare provider may also check your skin to look for sun damage. Screening tests  may be recommended. A screening test is done to check for diseases that may not cause symptoms. The screening tests you may need depend on your age, gender, family history, and lifestyle habits. For example, colorectal screening may be recommended if you are 48years old or older. Screening tests you need if you are a woman:   A Pap smear  is used to screen for cervical cancer. Pap smears are usually done every 3 to 5 years depending on your age. You may need them more often if you have had abnormal Pap smear test results in the past. Ask your healthcare provider how often you should have a Pap smear. A mammogram  is an x-ray of your breasts to screen for breast cancer. Experts recommend mammograms every 2 years starting at age 48 years. You may need a mammogram at age 52 years or younger if you have an increased risk for breast cancer. Talk to your healthcare provider about when you should start having mammograms and how often you need them. Vaccines you may need:   Get an influenza vaccine  every year. The influenza vaccine protects you from the flu. Several types of viruses cause the flu. The viruses change over time, so new vaccines are made each year. Get a tetanus-diphtheria (Td) booster vaccine  every 10 years. This vaccine protects you against tetanus and diphtheria. Tetanus is a severe infection that may cause painful muscle spasms and lockjaw. Diphtheria is a severe bacterial infection that causes a thick covering in the back of your mouth and throat. Get a human papillomavirus (HPV) vaccine  if you are female and aged 23 to 32 or male 23 to 24 and never received it. This vaccine protects you from HPV infection. HPV is the most common infection spread by sexual contact. HPV may also cause vaginal, penile, and anal cancers. Get a pneumococcal vaccine  if you are aged 72 years or older.  The pneumococcal vaccine is an injection given to protect you from pneumococcal disease. Pneumococcal disease is an infection caused by pneumococcal bacteria. The infection may cause pneumonia, meningitis, or an ear infection. Get a shingles vaccine  if you are 60 or older, even if you have had shingles before. The shingles vaccine is an injection to protect you from the varicella-zoster virus. This is the same virus that causes chickenpox. Shingles is a painful rash that develops in people who had chickenpox or have been exposed to the virus. How to eat healthy:  My Plate is a model for planning healthy meals. It shows the types and amounts of foods that should go on your plate. Fruits and vegetables make up about half of your plate, and grains and protein make up the other half. A serving of dairy is included on the side of your plate. The amount of calories and serving sizes you need depends on your age, gender, weight, and height. Examples of healthy foods are listed below:  Eat a variety of vegetables  such as dark green, red, and orange vegetables. You can also include canned vegetables low in sodium (salt) and frozen vegetables without added butter or sauces. Eat a variety of fresh fruits , canned fruit in 100% juice, frozen fruit, and dried fruit. Include whole grains. At least half of the grains you eat should be whole grains. Examples include whole-wheat bread, wheat pasta, brown rice, and whole-grain cereals such as oatmeal.    Eat a variety of protein foods such as seafood (fish and shellfish), lean meat, and poultry without skin (turkey and chicken). Examples of lean meats include pork leg, shoulder, or tenderloin, and beef round, sirloin, tenderloin, and extra lean ground beef. Other protein foods include eggs and egg substitutes, beans, peas, soy products, nuts, and seeds. Choose low-fat dairy products such as skim or 1% milk or low-fat yogurt, cheese, and cottage cheese. Limit unhealthy fats  such as butter, hard margarine, and shortening. Exercise:  Exercise at least 30 minutes per day on most days of the week. Some examples of exercise include walking, biking, dancing, and swimming. You can also fit in more physical activity by taking the stairs instead of the elevator or parking farther away from stores. Include muscle strengthening activities 2 days each week. Regular exercise provides many health benefits. It helps you manage your weight, and decreases your risk for type 2 diabetes, heart disease, stroke, and high blood pressure. Exercise can also help improve your mood. Ask your healthcare provider about the best exercise plan for you. General health and safety guidelines:   Do not smoke. Nicotine and other chemicals in cigarettes and cigars can cause lung damage. Ask your healthcare provider for information if you currently smoke and need help to quit. E-cigarettes or smokeless tobacco still contain nicotine. Talk to your healthcare provider before you use these products. Limit alcohol. A drink of alcohol is 12 ounces of beer, 5 ounces of wine, or 1½ ounces of liquor. Lose weight, if needed. Being overweight increases your risk of certain health conditions. These include heart disease, high blood pressure, type 2 diabetes, and certain types of cancer. Protect your skin. Do not sunbathe or use tanning beds. Use sunscreen with a SPF 15 or higher. Apply sunscreen at least 15 minutes before you go outside. Reapply sunscreen every 2 hours. Wear protective clothing, hats, and sunglasses when you are outside. Drive safely. Always wear your seatbelt. Make sure everyone in your car wears a seatbelt. A seatbelt can save your life if you are in an accident. Do not use your cell phone when you are driving. This could distract you and cause an accident. Pull over if you need to make a call or send a text message. Practice safe sex. Use latex condoms if are sexually active and have more than one partner.  Your healthcare provider may recommend screening tests for sexually transmitted infections (STIs). Wear helmets, lifejackets, and protective gear. Always wear a helmet when you ride a bike or motorcycle, go skiing, or play sports that could cause a head injury. Wear protective equipment when you play sports. Wear a lifejacket when you are on a boat or doing water sports. © Copyright [employer] 2022 Information is for End User's use only and may not be sold, redistributed or otherwise used for commercial purposes. The above information is an  only. It is not intended as medical advice for individual conditions or treatments. Talk to your doctor, nurse or pharmacist before following any medical regimen to see if it is safe and effective for you. Safe Sex Practices   AMBULATORY CARE:   Safe sex practices  are ways to prevent pregnancy and the spread of sexually transmitted infections (STIs). An STI happens when a virus or bacteria are spread through sexual activity. Safe sex practices help decrease or prevent body fluid exchange during sex. Body fluids include saliva, urine, blood, vaginal fluids, and semen. Oral, vaginal, and anal sex can all spread STIs. Seek care immediately if:   A condom breaks, leaks, or slips off while you are having sex. You notice sores on your penis, vagina, anal area, or skin around them. You have had unsafe sex and want to discuss emergency contraception or treatment for STI exposure. Call your doctor if:   You or your female sex partner might be pregnant. You have questions or concerns about your condition or care. Safe sex practices to follow before you have sex:   Talk to a new partner before you have sex. Tell your partner if you have an STI. Ask about his or her sex history and if he or she has a current or past STI. Your partner may need to be tested and treated.  Do not have sex while you are being treated for an STI, or with a partner who is being treated. Limit your number of sex partners. More than one sex partner can increase your risk for an STI. Do not have sex with anyone whose sex history you do not know. Get tested for STIs if needed. Get tested if you had sex with someone who has an STI. Get tested if you have unprotected sex with any new partner. Talk to your healthcare provider about birth control. Birth control can help prevent an unwanted pregnancy. There are many different types of birth control. Talk to your healthcare provider about which birth control method is right for you. Ask about medicines to lower your risk for some STIs:      Vaccines  can help protect you from hepatitis A, hepatitis B, and the human papillomavirus (HPV). The HPV vaccine is usually given at 11 years, but it may be given through 26 years to both females and males. Your provider can give you more information on vaccines to prevent STIs. Pre-exposure prophylaxis (PrEP)  may be given if you are at high risk for HIV. PrEP is taken every day to prevent the virus from fully infecting the body. Do not use alcohol or drugs before sex. These can prevent you from thinking clearly and increase your risk for unsafe sex. Safe sex practices to follow while you are having sex:   Use condoms and barrier methods for all types of sexual contact. This includes oral, vaginal, and anal sex. Male and female condoms are available. Make sure that the condom fits and is put on correctly. Rubber latex sheets or dental dams can be used for oral sex. Use a new condom or latex barrier each time you have sex. Use latex condoms, if possible. [place] or natural condoms do not prevent STIs. If you or your partner is allergic to latex, use a nonlatex product, such as polyurethane. Use a second form of birth control with the condom to prevent pregnancy and STIs. Do not use male and female condoms together. Only use water-based lubricants during sex.   Water-based lubricants help prevent sores or cuts in the vagina or on the penis. Prevent sores or cuts to decrease your risk for an STI. Do not use oil-based lubricants, such as baby oil or hand lotion, with latex condoms or barriers. These will weaken the latex and may cause the condom to break. Do not use chemicals that irritate your skin. Products that contain chemical irritants, such as spermicides, can irritate the lining of your vagina or rectum. Irritation may cause sores that can increase your risk for an STI. Be careful when you have sex if you have open sores or cuts. Open sores or cuts may increase your risk for an STI. Keep all open sores or cuts covered during sex. Do not have oral sex if you have cuts or sores in your mouth. Do not do activities that can pass germs. Do not use saliva as a lubricant or share sex toys. Follow up with your doctor as directed:  Write down your questions so you remember to ask them during your visits. © Copyright [facility] 2022 Information is for End User's use only and may not be sold, redistributed or otherwise used for commercial purposes. The above information is an  only. It is not intended as medical advice for individual conditions or treatments. Talk to your doctor, nurse or pharmacist before following any medical regimen to see if it is safe and effective for you. HPV (Human Papillomavirus)   AMBULATORY CARE:   Human Papillomavirus (HPV)  is the name for a group of viruses that can infect your skin or other parts of your body. HPV is the most common infection spread by sexual contact. It can also be spread from a mother to her baby during delivery. Common symptoms include the following:   Painless warts in your mouth or on your genitals    Genital or anal discharge, bleeding, itching, or pain    Pain when you urinate    Call your doctor if:   You have new or worsening symptoms. You have questions or concerns about your condition or care.     HPV diagnosis:  Your healthcare provider may use a vinegar liquid to help diagnose HPV genital warts. Women 27to 72years old can be checked for HPV during regular cervical cancer screenings. An HPV test checks for certain types of HPV that can cause changes in cervical cells. Without treatment, the changed cells can become cancer. An HPV test can be done every 5 years if the results show no infection. The test can be done with or without a Pap smear. A Pap smear checks for cancer or for abnormal cells that can become cancer. You may be tested for HPV if you have mouth or throat cancer. Treatment:  HPV cannot be cured, but an infection may go away on its own in about 2 years without causing problems. If the infection continues, some types of HPV can lead to health conditions that need to be treated. Examples include warts and certain cancers, especially squamous cell carcinoma (SCC). HPV-linked SCCs commonly develop in the anus, throat (called oropharyngeal cancer), cervix, vagina, penis, or mouth. HPV can also cause a type of cervical cancer called adenocarcinoma. Symptoms of any of these conditions may not develop for several years after you were exposed to HPV. You will need to be monitored closely. Ask your healthcare provider for more information about monitoring, conditions caused by HPV, and available treatments. Prevent an HPV infection:  HPV is usually spread through sexual activity. The following can help prevent infection:  Ask about the HPV vaccine. The HPV vaccine is given to females and males, usually at 6or 15years of age. It can be given from 9 years through 39years of age, if needed. It is most effective if given before sexual activity begins. Use a new condom, contraceptive barrier, or dental dam each time you have sex. This includes oral, vaginal, and anal sex. Talk to your healthcare provider if you have any questions about what to use or how to use it.     Follow up with your doctor as directed:  Write down your questions so you remember to ask them during your visits. © Copyright Alondra Cosby 2022 Information is for End User's use only and may not be sold, redistributed or otherwise used for commercial purposes. The above information is an  only. It is not intended as medical advice for individual conditions or treatments. Talk to your doctor, nurse or pharmacist before following any medical regimen to see if it is safe and effective for you. Perineal Hygiene      Your vaginal naturally takes care of its self, it is a self washing system, the less you mess the healthier it will be     No soaps or feminine wash to the vulva, these products can cause dermitis, bacterial infections and other vulvar problems. Use only water to cleanse, or water with Dove or [employer] if necessary. No scented lotions or products are advised in or near your vulva. Use only coconut oil for moisture if needed. No douching this may cause imbalance in your vaginal PH and further issues. If you wear panty liners, you may apply a thin coating of Vaseline, A&D ointment or coconut oil to the vulvar tissues as a skin barrier     [place] underware, loose fitting clothing  Only perfume-free, dye-free laundry detergent, use a second rinse cycle   Avoid fabric softeners/dryer sheets. Partner should avoid the same products as well. Over the counter probiotic to restore vaginal radha may be helpful as well, take daily. You may also look into Boric Acid vaginal suppositories to restore vaginal PH balance for up to 2 weeks as directed on the box. You may not use these if you are pregnant      For vaginal dryness: You may use:     Coconut oil (organic, pure, unscented) as needed for moisture or lubrication.  ( Do not use if allergic)       Replens moisture restore external comfort gel daily ( use as directed on the box)        Replens long lasting vaginal moisturizer  ( use as directed on the box)         For Vaginal Lubrication:          You may use:     Coconut oil (organic, pure, unscented) as a lubricant or another scent-free lubricant (Astroglide, Uberlube) if needed. Do not use coconut oil or silicone if using a condom as this may break down the integrity of the condom and cause an unplanned pregnancy              Do not use coconut oil if allergic               Replens silky smooth lubricant, premium silicone based lubricant for intercourse. ( use as directed, a small amount will provide an enhanced natural feeling)     Any premium over the counter vaginal lubricant water or silicone based. Silicone based will have more staying power. Amenorrhea   AMBULATORY CARE:   Amenorrhea  is the absence of menstruation (your monthly period). Primary amenorrhea means your period did not start by age 12. This is usually because of a lack of reproductive organs, such as a uterus. Breasts and other signs of puberty that usually start to develop by age 15 do not develop. Secondary amenorrhea means you stopped having regular periods for at least 3 months or irregular periods for 6 months. Amenorrhea may be a sign of a serious medical problem that needs to be treated. Common symptoms include the following:   Hair growth on your face or over your breastbone, or bald patches    Acne    Pelvic pain    Headaches    Vision changes    Bruises or patches of dark skin    Call your doctor or gynecologist for any of the following: You notice changes in your menstrual cycle, such as periods that start and stop a few times. Your female child is 15, has not started menstruating, and is not developing signs of puberty. Your female child is 12 and has developed signs of puberty, but she has not started menstruating. You have questions or concerns about your condition or care.     Treatment for amenorrhea  may include birth control pills to restart and regulate your periods. You may need medicines to treat medical conditions such as a thyroid or pituitary disorder, or PCOS. Surgery may fix a problem that is preventing blood from flowing through your vagina, or to remove a tumor. Prevent or manage amenorrhea:   Maintain a healthy weight. Low body weight, overweight, or obesity can all affect your period. Your healthcare provider can help you create a plan to reach and maintain a healthy weight. Eat healthy foods. Healthy foods include fruits, vegetables, whole-grain breads, low-fat dairy products, beans, lean meats, and fish. You may need to have more calcium and vitamin D if your amenorrhea is caused by low estrogen levels. Low estrogen can affect bone strength. Calcium and vitamin D work together to help build bone. Your healthcare provider or a dietitian can help you create a meal plan that has the right number of calories and nutrients for you. Exercise as directed. Exercise can help you build or maintain bone. Exercise can also help you lose weight if you are overweight. Ask your healthcare provider how much to exercise and which exercises are best for you. Do not exercise more than your healthcare provider recommends. Too much exercise can cause your period to stop. Keep a record of your monthly periods. Record the dates your period started and stopped. Also record any pain or other problems during your period. Manage stress. Try new ways to relax, such as deep breathing. Ask your healthcare provider for more information on stress management. Follow up with your doctor or gynecologist as directed:  Write down your questions so you remember to ask them during your visits. © Copyright [facility] 2022 Information is for End User's use only and may not be sold, redistributed or otherwise used for commercial purposes. The above information is an  only. It is not intended as medical advice for individual conditions or treatments. Talk to your doctor, nurse or pharmacist before following any medical regimen to see if it is safe and effective for you.

## 2023-09-14 ENCOUNTER — APPOINTMENT (OUTPATIENT)
Dept: LAB | Facility: CLINIC | Age: 23
End: 2023-09-14
Payer: COMMERCIAL

## 2023-09-14 ENCOUNTER — ANNUAL EXAM (OUTPATIENT)
Dept: OBGYN CLINIC | Facility: CLINIC | Age: 23
End: 2023-09-14
Payer: COMMERCIAL

## 2023-09-14 VITALS
DIASTOLIC BLOOD PRESSURE: 70 MMHG | BODY MASS INDEX: 20.4 KG/M2 | SYSTOLIC BLOOD PRESSURE: 120 MMHG | HEIGHT: 67 IN | WEIGHT: 130 LBS

## 2023-09-14 DIAGNOSIS — N91.2 AMENORRHEA: ICD-10-CM

## 2023-09-14 DIAGNOSIS — Z01.419 ENCOUNTER FOR GYNECOLOGICAL EXAMINATION WITHOUT ABNORMAL FINDING: Primary | ICD-10-CM

## 2023-09-14 DIAGNOSIS — N91.1 SECONDARY AMENORRHEA: ICD-10-CM

## 2023-09-14 DIAGNOSIS — Z01.419 ENCOUNTER FOR GYNECOLOGICAL EXAMINATION WITHOUT ABNORMAL FINDING: ICD-10-CM

## 2023-09-14 LAB
ALBUMIN SERPL BCP-MCNC: 4.1 G/DL (ref 3.5–5)
ALP SERPL-CCNC: 44 U/L (ref 34–104)
ALT SERPL W P-5'-P-CCNC: 40 U/L (ref 7–52)
ANION GAP SERPL CALCULATED.3IONS-SCNC: 7 MMOL/L
AST SERPL W P-5'-P-CCNC: 60 U/L (ref 13–39)
BILIRUB SERPL-MCNC: 1 MG/DL (ref 0.2–1)
BUN SERPL-MCNC: 13 MG/DL (ref 5–25)
CALCIUM SERPL-MCNC: 9.5 MG/DL (ref 8.4–10.2)
CHLORIDE SERPL-SCNC: 105 MMOL/L (ref 96–108)
CO2 SERPL-SCNC: 31 MMOL/L (ref 21–32)
CREAT SERPL-MCNC: 0.75 MG/DL (ref 0.6–1.3)
ERYTHROCYTE [DISTWIDTH] IN BLOOD BY AUTOMATED COUNT: 12.3 % (ref 11.6–15.1)
ESTRADIOL SERPL-MCNC: <15 PG/ML
FSH SERPL-ACNC: 5.2 MIU/ML
GFR SERPL CREATININE-BSD FRML MDRD: 112 ML/MIN/1.73SQ M
GLUCOSE SERPL-MCNC: 79 MG/DL (ref 65–140)
HCT VFR BLD AUTO: 40.8 % (ref 34.8–46.1)
HGB BLD-MCNC: 12.9 G/DL (ref 11.5–15.4)
LH SERPL-ACNC: 8.2 MIU/ML
MCH RBC QN AUTO: 30.2 PG (ref 26.8–34.3)
MCHC RBC AUTO-ENTMCNC: 31.6 G/DL (ref 31.4–37.4)
MCV RBC AUTO: 96 FL (ref 82–98)
PLATELET # BLD AUTO: 213 THOUSANDS/UL (ref 149–390)
PMV BLD AUTO: 10.1 FL (ref 8.9–12.7)
POTASSIUM SERPL-SCNC: 4.4 MMOL/L (ref 3.5–5.3)
PROLACTIN SERPL-MCNC: 9.54 NG/ML (ref 3.34–26.72)
PROT SERPL-MCNC: 5.7 G/DL (ref 6.4–8.4)
RBC # BLD AUTO: 4.27 MILLION/UL (ref 3.81–5.12)
SODIUM SERPL-SCNC: 143 MMOL/L (ref 135–147)
TSH SERPL DL<=0.05 MIU/L-ACNC: 1.76 UIU/ML (ref 0.45–4.5)
WBC # BLD AUTO: 4.32 THOUSAND/UL (ref 4.31–10.16)

## 2023-09-14 PROCEDURE — 82627 DEHYDROEPIANDROSTERONE: CPT

## 2023-09-14 PROCEDURE — 84443 ASSAY THYROID STIM HORMONE: CPT

## 2023-09-14 PROCEDURE — 83001 ASSAY OF GONADOTROPIN (FSH): CPT

## 2023-09-14 PROCEDURE — 36415 COLL VENOUS BLD VENIPUNCTURE: CPT

## 2023-09-14 PROCEDURE — 82670 ASSAY OF TOTAL ESTRADIOL: CPT

## 2023-09-14 PROCEDURE — 83002 ASSAY OF GONADOTROPIN (LH): CPT

## 2023-09-14 PROCEDURE — 80053 COMPREHEN METABOLIC PANEL: CPT

## 2023-09-14 PROCEDURE — 84146 ASSAY OF PROLACTIN: CPT

## 2023-09-14 PROCEDURE — 82397 CHEMILUMINESCENT ASSAY: CPT

## 2023-09-14 PROCEDURE — 84403 ASSAY OF TOTAL TESTOSTERONE: CPT

## 2023-09-14 PROCEDURE — 85027 COMPLETE CBC AUTOMATED: CPT

## 2023-09-14 PROCEDURE — S0612 ANNUAL GYNECOLOGICAL EXAMINA: HCPCS | Performed by: OBSTETRICS & GYNECOLOGY

## 2023-09-14 PROCEDURE — 83498 ASY HYDROXYPROGESTERONE 17-D: CPT

## 2023-09-14 PROCEDURE — G0145 SCR C/V CYTO,THINLAYER,RESCR: HCPCS | Performed by: OBSTETRICS & GYNECOLOGY

## 2023-09-14 PROCEDURE — 84402 ASSAY OF FREE TESTOSTERONE: CPT

## 2023-09-14 NOTE — PROGRESS NOTES
LMP: 6/10/2023  PMB:  SA:  Never   HPV: NO  Birth control:  NONE   Last pap: Not on file  Last mammo: Not on file:  Last colonoscopy: Not on file  Last Dexa: Not on file  Family History: No gyn cancer in the family history .

## 2023-09-15 LAB
DHEA-S SERPL-MCNC: 351 UG/DL (ref 110–431.7)
TESTOST FREE SERPL-MCNC: 2 PG/ML (ref 0–4.2)
TESTOST SERPL-MCNC: 43 NG/DL (ref 13–71)

## 2023-09-18 LAB — 17OHP SERPL-MCNC: 14 NG/DL

## 2023-09-19 LAB — MIS SERPL-MCNC: 4.95 NG/ML

## 2023-09-20 LAB
LAB AP GYN PRIMARY INTERPRETATION: NORMAL
Lab: NORMAL

## 2023-09-21 ENCOUNTER — TELEPHONE (OUTPATIENT)
Dept: OBGYN CLINIC | Facility: CLINIC | Age: 23
End: 2023-09-21

## 2023-09-28 DIAGNOSIS — N91.2 AMENORRHEA: Primary | ICD-10-CM

## 2023-09-28 NOTE — PROGRESS NOTES
Spoke with patient reviewed lab results. Referral placed to endocrinology for further evaluation of amenorrhea, low estrogen level.   Patient has no desire to start birth control for hormonal regulation

## 2023-11-07 ENCOUNTER — TELEPHONE (OUTPATIENT)
Dept: PSYCHIATRY | Facility: CLINIC | Age: 23
End: 2023-11-07

## 2023-11-07 NOTE — TELEPHONE ENCOUNTER
Writer DEEPAK for patient to return call to office to schedule NP appointment. Please transfer call to writer.  Thank you

## 2023-11-07 NOTE — TELEPHONE ENCOUNTER
Writer contacted patient and patient has been scheduled for 11/14 appointment at [address] [place] [address] confirmed insurance and address. New patient packet will be mailed to patient to bring to appointment.

## 2023-11-07 NOTE — TELEPHONE ENCOUNTER
Patient was returning call she received in regards to scheduling NP appt, patient stated she is available for the rest of the day

## 2023-11-14 ENCOUNTER — SOCIAL WORK (OUTPATIENT)
Dept: BEHAVIORAL/MENTAL HEALTH CLINIC | Facility: CLINIC | Age: 23
End: 2023-11-14

## 2023-11-14 DIAGNOSIS — F43.9 STRESS: ICD-10-CM

## 2023-11-14 DIAGNOSIS — F50.9 EATING DISORDER, UNSPECIFIED TYPE: Primary | ICD-10-CM

## 2023-11-14 PROCEDURE — 90791 PSYCH DIAGNOSTIC EVALUATION: CPT | Performed by: COUNSELOR

## 2023-11-14 NOTE — PSYCH
Behavioral Health Psychotherapy Assessment    Date of Initial Psychotherapy Assessment: 11/14/23  Referral Source: [facility]  Has a release of information been signed for the referral source? NA    Preferred Name: Krissy Rush  Preferred Pronouns: She/her  YOB: 2000 Age: 21 y.o. Sex assigned at birth: female   Gender Identity: Female  Race:   Preferred Language: English    Emergency Contact:  Full Name: Kavon Araujo   Relationship to Client: Amanda   Contact information: [phone]     Primary Care Physician:  Epi Moss, [address] ,[facility] [address] [facility], [facility]  [phone]  Has a release of information been signed? No    Physical Health History:  Past surgical procedures: n/a  Do you have a history of any of the following: n/a  Do you have any mobility issues? No    Relevant Family History:  Mom struggled with some disorganized eating. Mom would give her family members the silent treatment when upset with one person,and would not speak to anyone. 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 She has been dealing with restrictive eating in 2020 and has always been an Religious exerciser. These became unhealthy within this past summer. She has also been dealing with some parent issues and got into conflict with her dad. Mental Health Advance Directive:  Do you currently have a Mental Health Advance Directive? no    Diagnosis:   Diagnosis ICD-10-CM Associated Orders   1. Eating disorder, unspecified type  F50.9       2.  Stress  F43.9           Initial Assessment:     Current Mental Status:    Appearance: appropriate and casual      Behavior/Manner: cooperative      Affect/Mood:  Good, happy and hopeful    Sleep:  Normal    Oriented to: oriented to self, oriented to place and oriented to time       Clinical Symptoms    Anxiety: yes      Anxiety Symptoms: nervous/anxious      Have you ever been assaultive to others or the environment: No      Have you ever been self-injurious: No      Counseling History:  Previous Counseling or Treatment  (Mental Health or Drug & Alcohol): No    Have you previously taken psychiatric medications: No      Suicide Risk Assessment  Have you ever had a suicide attempt: No    Have you had incidents of suicidal ideation: No    Are you currently experiencing suicidal thoughts: No      Substance Abuse/Addiction Assessment:  Alcohol: No    Heroin: No    Fentanyl: No    Opiates: No    Cocaine: No    Amphetamines: No    Hallucinogens: No    Club Drugs: No    Benzodiazepines: No    Other Rx Meds: No    Marijuana: No    Tobacco/Nicotine: No    Have you experienced blackouts as a result of substance use: No    Have you had any periods of abstinence: No    Have you experienced symptoms of withdrawal: No    Have you ever overdosed on any substances?: No    Are you currently using any Medication Assisted Treatment for Substance Use: No      Compulsive Behaviors:  Compulsive Behavior Information:  N/a    Disordered Eating History:  Do you have a history of disordered eating: Yes    Type of disordered eating: restrictive eating pattern      Social Determinants of Health:    SDOH:  None    Trauma and Abuse History:    Have you ever been abused: No      Legal History:    Have you ever been arrested  or had a DUI: No      Have you been incarcerated: No      Are you currently on parole/probation: No      Any current Children and Youth involvement: No      Any pending legal charges: No      Relationship History:    Current marital status: single      Natural Supports:  Siblings    Relationship History:  Fiance since May 2023     Employment History    Are you currently employed: No      Currently seeking employment: No      Sources of income/financial support:  Family members     History:      Status: no history of  duty  Educational History:     Have you ever been diagnosed with a learning disability: No      Highest level of education:  Master's Degree    School attended/attending:  [employer]    Have you ever had an IEP or 504-plan: No      Do you need assistance with reading or writing: No          Visit start and stop times:    11/14/23  Start Time: 1402  Stop Time: 1450  Total Visit Time: 48 minutes

## 2023-12-08 ENCOUNTER — SOCIAL WORK (OUTPATIENT)
Dept: BEHAVIORAL/MENTAL HEALTH CLINIC | Facility: CLINIC | Age: 23
End: 2023-12-08
Payer: COMMERCIAL

## 2023-12-08 DIAGNOSIS — F43.9 STRESS: ICD-10-CM

## 2023-12-08 DIAGNOSIS — F50.9 EATING DISORDER, UNSPECIFIED TYPE: Primary | ICD-10-CM

## 2023-12-08 PROCEDURE — 90834 PSYTX W PT 45 MINUTES: CPT | Performed by: COUNSELOR

## 2023-12-08 NOTE — BH TREATMENT PLAN
[id number] [address]ed[address]  2000     Date of Initial Psychotherapy Assessment: 11/14/2023  Date of Current Treatment Plan: 12/08/23  Treatment Plan Target Date: 6/5/2024  Treatment Plan Expiration Date: 6/5/2024    Diagnosis:   1. Eating disorder, unspecified type        2. Stress            Area(s) of Need: coping skills to manage symptoms and processing thorough her stress    Long Term Goal 1 (in the client's own words): I want to handle stress better and deal with frustration on my own. Stage of Change: Preparation    Target Date for completion: TBD     Anticipated therapeutic modalities: Solution-Focused Therapy, Mindfulness-Based Therapy, Client-Centered Therapy, Cognitive Behavioral Therapy, Supportive Therapy     People identified to complete this goal: Rebeca Orozco and Provider      Objective 1: (identify the means of measuring success in meeting the objective): Rebecavciky Orozco will identify the triggers to her stress and frustrations and process through them in session      Objective 2: (identify the means of measuring success in meeting the objective): Rebeca Minildefonso will learn coping skills to manage her symptoms. Long Term Goal 2 (in the client's own words): I want to work on my anxiety around food and body image    Stage of Change: Preparation    Target Date for completion: TBD     Anticipated therapeutic modalities: Solution-Focused Therapy, Mindfulness-Based Therapy, Client-Centered Therapy, Cognitive Behavioral Therapy, Supportive Therapy     People identified to complete this goal: Rebeca Orozco and Provider      Objective 1: (identify the means of measuring success in meeting the objective): Rebeca Minildefonso will discuss the struggles surrounding her eating disorder and learn ways to cope      Objective 2: (identify the means of measuring success in meeting the objective): Rebeca Copelandildefonso will change her thought process from negative to positive in the way she views her.        I am currently under the care of a [facility] psychiatric provider: no    My [facility] psychiatric provider is: n/a    I am currently taking psychiatric medications: No    I feel that I will be ready for discharge from mental health care when I reach the following (measurable goal/objective): "Once I feel like I'm handling my emotions on my own and not having to talk things out."     For children and adults who have a legal guardian:   Has there been any change to custody orders and/or guardianship status? NA. If yes, attach updated documentation. I have created my Crisis Plan and have been offered a copy of this plan    [address]: Diagnosis and Treatment Plan explained to [address] acknowledges an understanding of their diagnosis. Sarbjit Karen agrees to this treatment plan.     I have been offered a copy of this Treatment Plan. yes

## 2023-12-08 NOTE — PSYCH
Behavioral Health Psychotherapy Progress Note    Psychotherapy Provided: Individual Psychotherapy     1. Eating disorder, unspecified type        2. Stress            Goals addressed in session: Goal 1     DATA: Nicanor Sung arrived for her individual session today. We created her treatment plan and crisis plan. Nicanor Sung recognizes that she's under a lot of stress and wants to be able to learn how to manage it. She began talking about how her family especially her siblings have different view points from her at times. She expressed that this causes her to become very angry when speaking to some of them. We discussed some of the context and how Nicanor Sung feels about certain topics. Nicanor Sung stated that she recognizes some of her friends have different views from her, but they often make fun of those views therefore she hasn't been able to be open and honest with them. Nicanor Sung expressed that she broke down to her one friend and that she knows about some of her views. During this session, this clinician used the following therapeutic modalities: Client-centered Therapy and Supportive Psychotherapy    Substance Abuse was not addressed during this session. If the client is diagnosed with a co-occurring substance use disorder, please indicate any changes in the frequency or amount of use: n/a. Stage of change for addressing substance use diagnoses: No substance use/Not applicable    ASSESSMENT:  Omi Pittman presents with a Euthymic/ normal mood. her affect is Normal range and intensity and Tearful, which is congruent, with her mood and the content of the session. The client has not made progress on their goals. Nicanor Sung was tearful at times when speaking about her views and how she can't be open and honest. This seems to bother her that she needs to hide who she truly is to others. She seemed shy to discuss these topics, but was open to talking about them.  Omi Pittman presents with a none risk of suicide, none risk of self-harm, and none risk of harm to others. For any risk assessment that surpasses a "low" rating, a safety plan must be developed. A safety plan was indicated: no  If yes, describe in detail n/a    PLAN: Between sessions, Deborah Block will challenge herself to share some of her view points with friends and family. At the next session, the therapist will use Client-centered Therapy and Supportive Psychotherapy to address her stress and eating disorder. Behavioral Health Treatment Plan and Discharge Planning: Deborah Block is aware of and agrees to continue to work on their treatment plan. They have identified and are working toward their discharge goals.  yes    Visit start and stop times:    12/08/23  Start Time: 1003  Stop Time: 1053  Total Visit Time: 50 minutes

## 2023-12-08 NOTE — BH CRISIS PLAN
Client Name: Bernie Enrique       Client YOB: 2000  : 2000    Treatment Team (include name and contact information):     Psychotherapist: Joanna Colon, [place] [address] Provider  DO Michael Mcgovern [facility] 2nd Floor, [address]  [phone]    Type of Plan   * Child plans (children 15 yo and younger) must be completed and signed by the child's legal guardian   * Plans for all individuals 15 yo and above must be signed by the client. Plan Type: adolescent/adult (15 and over) Initial      My Personal Strengths are (in the client's own words):  "Hardworking, honest, good at transitions, coachable "  The stressors and triggers that may put me at risk are:  When friends make comments about eating habits, non-school related things on my agenda (being overwhelmed), having a lot of downtime    Coping skills I can use to keep myself calm and safe:  Physical activity and Call a friend or family member    Coping skills/supports I can use to maintain abstinence from substance use:   Not Applicable    The people that provide me with help and support: (Include name, contact, and how they can help)   Support person #1: Fiance    * Phone number: in cell phone    * How can they help me? N/a   Support person #2:Sisters    * Phone number: in cell phone    * How can they help me?  N/a    In the past, the following has helped me in times of crisis:    Other: Physical activity and Call a friend or family member      If it is an emergency and you need immediate help, call     If there is a possibility of danger to yourself or others, call the following crisis hotline resources:     Adult Crisis Numbers  Suicide Prevention Hotline - Dial   [facility]: [address]: [address]: [address]: [address]: [address]: [address]: [address]: [phone] (daytime). [phone] (after hours, weekends, holidays)     Child/Adolescent Crisis Numbers   [facility]: [address] [facility]: [phone]   [facility]: [phone]   [facility]: [phone]    Please note: Some [facility] do not have a separate number for Child/Adolescent specific crisis. If your county is not listed under Child/Adolescent, please call the adult number for your county     National Talk to Text Line   All Ages - 316-201    In the event your feelings become unmanageable, and you cannot reach your support system, you will call 911 immediately or go to the nearest hospital emergency room.

## 2023-12-14 ENCOUNTER — TELEPHONE (OUTPATIENT)
Dept: PSYCHIATRY | Facility: CLINIC | Age: 23
End: 2023-12-14

## 2023-12-14 NOTE — TELEPHONE ENCOUNTER
Patient is calling regarding cancelling an appointment.     Date/Time: 12/21 @ 11    Reason: patient    Patient was rescheduled: YES [] NO [x]  If yes, when was Patient reschedule for:     Patient requesting call back to reschedule: YES [] NO [x]    Pt will see provider at next appt 1/4

## 2024-01-04 ENCOUNTER — SOCIAL WORK (OUTPATIENT)
Dept: BEHAVIORAL/MENTAL HEALTH CLINIC | Facility: CLINIC | Age: 24
End: 2024-01-04
Payer: COMMERCIAL

## 2024-01-04 DIAGNOSIS — F50.9 EATING DISORDER, UNSPECIFIED TYPE: Primary | ICD-10-CM

## 2024-01-04 DIAGNOSIS — F43.9 STRESS: ICD-10-CM

## 2024-01-04 PROCEDURE — 90834 PSYTX W PT 45 MINUTES: CPT | Performed by: COUNSELOR

## 2024-01-04 NOTE — PSYCH
Behavioral Health Psychotherapy Progress Note    Psychotherapy Provided: Individual Psychotherapy     1. Eating disorder, unspecified type        2. Stress            Goals addressed in session: Goal 1     DATA: Cathy arrived for her individual session today. [facility]. Cathy expressed that her winter break went really well and she was able to improve her relationship with her mom. She stated that she was able to share her thoughts and feelings with her and set boundaries. Cathy identified that she wasn't sure what to discuss today. This provider reminder her of struggling with disorganized eating when she originally came for reason for referral.  She shared that she's exercising 2.5 hours a day and eating 3 meals with some snacks in between, but also tracks her calories. Cathy identified that she's been struggling with some thoughts about eating too much at certain times, but also wanting to exercise as she feels when she gets older sh won't be able to do it. She recognizes that her parents come home from work and watch TV and she is beginning to adapt that this could be her. We discussed how she can set her life trajectory for however she sees fit.   During this session, this clinician used the following therapeutic modalities: Client-centered Therapy, Cognitive Behavioral Therapy, and Supportive Psychotherapy    Substance Abuse was not addressed during this session. If the client is diagnosed with a co-occurring substance use disorder, please indicate any changes in the frequency or amount of use: n/a. Stage of change for addressing substance use diagnoses: No substance use/Not applicable    ASSESSMENT:  Cathy Sewell presents with a Euthymic/ normal mood.     her affect is Normal range and intensity, which is congruent, with her mood and the content of the session. The client has made progress on their goals.    Cathy continues to struggle with her thoughts  "that cause her disorganized eating and over exercising. Cathy Sewell presents with a none risk of suicide, none risk of self-harm, and none risk of harm to others.    For any risk assessment that surpasses a \"low\" rating, a safety plan must be developed.    A safety plan was indicated: no  If yes, describe in detail n/a    PLAN: Between sessions, Cathy Sewell will continue challenging her thoughts. At the next session, the therapist will use Client-centered Therapy, Cognitive Behavioral Therapy, and Supportive Psychotherapy to address her stress and eating disorder.    Behavioral Health Treatment Plan and Discharge Planning: Cathy Sewell is aware of and agrees to continue to work on their treatment plan. They have identified and are working toward their discharge goals. yes    Visit start and stop times:    01/04/24  Start Time: 1515  Stop Time: 1555  Total Visit Time: 40 minutes  "

## 2024-02-14 ENCOUNTER — OFFICE VISIT (OUTPATIENT)
Dept: INTERNAL MEDICINE CLINIC | Facility: CLINIC | Age: 24
End: 2024-02-14
Payer: COMMERCIAL

## 2024-02-14 VITALS
HEART RATE: 70 BPM | OXYGEN SATURATION: 99 % | TEMPERATURE: 97.7 F | BODY MASS INDEX: 21.66 KG/M2 | RESPIRATION RATE: 14 BRPM | DIASTOLIC BLOOD PRESSURE: 68 MMHG | HEIGHT: 67 IN | SYSTOLIC BLOOD PRESSURE: 102 MMHG | WEIGHT: 138 LBS

## 2024-02-14 DIAGNOSIS — N63.14 MASS OF LOWER INNER QUADRANT OF RIGHT BREAST: Primary | ICD-10-CM

## 2024-02-14 PROCEDURE — 99213 OFFICE O/P EST LOW 20 MIN: CPT | Performed by: INTERNAL MEDICINE

## 2024-02-14 NOTE — PROGRESS NOTES
"Assessment/Plan:    Problem List Items Addressed This Visit    None  Visit Diagnoses     Mass of lower inner quadrant of right breast    -  Primary    -obtain diagnostic breast US  -no FH breast cancer    Relevant Orders    US breast right limited (diagnostic)            Subjective:      Patient ID: Cathy Sewell is a 23 y.o. female.    HPI    In beginning of January, felt a bump on R inner part of her breast.  It decreased in size but then it returned to its previous size.  Did not hurt before but now when she presses on it has minimal pain.  Denies trauma, swelling, redness or drainage.    She has been getting her periods regularly over the past 3 months.  LMP 1/15/2024.  No new bras.  No FH of breast cancer.    The following portions of the patient's history were reviewed and updated as appropriate: allergies, current medications, past family history, past medical history, past social history, past surgical history and problem list.    No current outpatient medications on file.    Review of Systems   Constitutional:  Negative for activity change and fever.   Respiratory:  Negative for cough, shortness of breath and wheezing.    Cardiovascular:  Negative for chest pain.   Skin:  Negative for color change, rash and wound.         Objective:    /68 (BP Location: Left arm, Patient Position: Sitting, Cuff Size: Standard)   Pulse 70   Temp 97.7 °F (36.5 °C)   Resp 14   Ht 5' 7\" (1.702 m)   Wt 62.6 kg (138 lb)   LMP 01/15/2024 (Exact Date)   SpO2 99%   BMI 21.61 kg/m²      Physical Exam  Vitals reviewed.   Cardiovascular:      Rate and Rhythm: Normal rate and regular rhythm.      Pulses: Normal pulses.      Heart sounds: Normal heart sounds.   Pulmonary:      Effort: Pulmonary effort is normal. No respiratory distress.      Breath sounds: Normal breath sounds. No wheezing.   Chest:   Breasts:     Right: Mass (1.5cm circular mass on R lower inner breast, mildly tender on palpation without overlying " erythema) and tenderness present. No swelling, nipple discharge or skin change.      Left: Normal.   Lymphadenopathy:      Upper Body:      Right upper body: No supraclavicular, axillary or pectoral adenopathy.      Left upper body: No supraclavicular, axillary or pectoral adenopathy.

## 2024-02-23 ENCOUNTER — TELEPHONE (OUTPATIENT)
Dept: BEHAVIORAL/MENTAL HEALTH CLINIC | Facility: CLINIC | Age: 24
End: 2024-02-23

## 2024-02-23 ENCOUNTER — APPOINTMENT (OUTPATIENT)
Dept: LAB | Facility: CLINIC | Age: 24
End: 2024-02-23

## 2024-02-23 DIAGNOSIS — Z11.1 ENCOUNTER FOR SCREENING FOR RESPIRATORY TUBERCULOSIS: ICD-10-CM

## 2024-02-23 PROCEDURE — 86480 TB TEST CELL IMMUN MEASURE: CPT

## 2024-02-23 PROCEDURE — 36415 COLL VENOUS BLD VENIPUNCTURE: CPT

## 2024-02-23 NOTE — TELEPHONE ENCOUNTER
Provider sent patient an email asking her to give the office a call as she canceled two of her appointments with this provider. She doesn't have any other appointments scheduled.

## 2024-02-24 LAB
GAMMA INTERFERON BACKGROUND BLD IA-ACNC: 0.06 IU/ML
M TB IFN-G BLD-IMP: NEGATIVE
M TB IFN-G CD4+ BCKGRND COR BLD-ACNC: -0.01 IU/ML
M TB IFN-G CD4+ BCKGRND COR BLD-ACNC: -0.02 IU/ML
MITOGEN IGNF BCKGRD COR BLD-ACNC: 9.94 IU/ML

## 2024-03-11 ENCOUNTER — HOSPITAL ENCOUNTER (OUTPATIENT)
Dept: ULTRASOUND IMAGING | Facility: CLINIC | Age: 24
Discharge: HOME | End: 2024-03-11
Payer: COMMERCIAL

## 2024-03-11 DIAGNOSIS — N63.14 MASS OF LOWER INNER QUADRANT OF RIGHT BREAST: ICD-10-CM

## 2024-03-11 PROCEDURE — 76642 ULTRASOUND BREAST LIMITED: CPT

## 2024-03-26 ENCOUNTER — TELEPHONE (OUTPATIENT)
Dept: PSYCHIATRY | Facility: CLINIC | Age: 24
End: 2024-03-26

## 2024-03-26 NOTE — TELEPHONE ENCOUNTER
Patient contacted the office to schedule a follow up visit with provider. Patient is now scheduled for 4/23  at 3 pm in office.

## 2024-04-23 ENCOUNTER — TELEPHONE (OUTPATIENT)
Dept: BEHAVIORAL/MENTAL HEALTH CLINIC | Facility: CLINIC | Age: 24
End: 2024-04-23

## 2024-04-23 NOTE — TELEPHONE ENCOUNTER
Patient emailed provider to cancel her appointment as she thought it was tomorrow and not today. Provider emailed her a list of open appointment times.

## 2024-04-30 NOTE — TELEPHONE ENCOUNTER
Patient emailed provider back expressing dates/times didn't work and she would call the main number to schedule.

## 2024-05-01 NOTE — TELEPHONE ENCOUNTER
Patient contacted the office to schedule a follow up visit with provider. Patient is now scheduled for 5/13/24  at 2pm in office.

## 2024-05-13 ENCOUNTER — SOCIAL WORK (OUTPATIENT)
Dept: BEHAVIORAL/MENTAL HEALTH CLINIC | Facility: CLINIC | Age: 24
End: 2024-05-13

## 2024-05-13 DIAGNOSIS — F43.9 STRESS: ICD-10-CM

## 2024-05-13 DIAGNOSIS — F43.22 ADJUSTMENT DISORDER WITH ANXIOUS MOOD: Primary | ICD-10-CM

## 2024-05-13 NOTE — BH TREATMENT PLAN
Outpatient Behavioral Health Psychotherapy Treatment Plan     Cathy Sewell  2000      Date of Initial Psychotherapy Assessment: 11/14/2023  Date of Current Treatment Plan: 5/13/2024  Treatment Plan Target Date: 11/9/2024  Treatment Plan Expiration Date:11/9/2024     Diagnosis:   1. Adjustment disorder with anxiety          2. Stress                Area(s) of Need: coping skills to manage symptoms and processing thorough her stress     Long Term Goal 1 (in the client's own words): I want to handle stress better and deal with frustration on my own.    Progress: Cathy continues to work on this goal now that she's in rotations and learning constructive feedback. She continues to struggle with her self-esteem and will continue with positive affirmations.      Stage of Change: Action      Target Date for completion: TBD             Anticipated therapeutic modalities: Solution-Focused Therapy, Mindfulness-Based Therapy, Client-Centered Therapy, Cognitive Behavioral Therapy, Supportive Therapy             People identified to complete this goal: Cathy and Provider                    Objective 1: (identify the means of measuring success in meeting the objective): Cathy will identify the triggers to her stress and frustrations and process through them in session                    Objective 2: (identify the means of measuring success in meeting the objective): Cathy will learn coping skills to manage her symptoms.       Long Term Goal 2 (in the client's own words): I want to work on my anxiety around food and body image    Progress: She has been managing her thoughts around eating. She continues to struggle with thoughts around body image and bloating. She does continue to workout and maintain a routine.      Stage of Change: Action      Target Date for completion: TBD             Anticipated therapeutic modalities: Solution-Focused Therapy, Mindfulness-Based Therapy, Client-Centered Therapy, Cognitive Behavioral  "Therapy, Supportive Therapy             People identified to complete this goal: Cathy and Provider                    Objective 1: (identify the means of measuring success in meeting the objective): Cathy will discuss the struggles surrounding her eating disorder and learn ways to cope                    Objective 2: (identify the means of measuring success in meeting the objective): Cathy will change her thought process from negative to positive in the way she views her.       I am currently under the care of a [facility] psychiatric provider: no     My [facility] psychiatric provider is: n/a     I am currently taking psychiatric medications: No     I feel that I will be ready for discharge from mental health care when I reach the following (measurable goal/objective): \"Once I feel like I'm handling my emotions on my own and not having to talk things out.\"      For children and adults who have a legal guardian:          Has there been any change to custody orders and/or guardianship status? NA. If yes, attach updated documentation.     I have not created my Crisis Plan and have been offered a copy of this plan     Behavioral Health Treatment Plan [facility]: Diagnosis and Treatment Plan explained to Cathy Sewell acknowledges an understanding of their diagnosis. Cathy Sewell agrees to this treatment plan.     I have been offered a copy of this Treatment Plan. yes        "

## 2024-05-13 NOTE — PSYCH
"Behavioral Health Psychotherapy Progress Note    Psychotherapy Provided: Individual Psychotherapy     1. Adjustment disorder with anxious mood        2. Stress            Goals addressed in session: Goal 1     DATA: Cathy arrived for her individual session today. We updated her treatment plan. She expressed that she doesn't feel her eating has been a problem, but she continues to struggle with her own self image. She explained that she compares herself a lot to others and doesn't always see the good in herself. We discussed incorporating positive affirmation. She shared about some increased stress as she's getting  this weekend. She expressed that she's feeling a little more frustrated then normal especially at her family. 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   During this session, this clinician used the following therapeutic modalities: Client-centered Therapy and Supportive Psychotherapy    Substance Abuse was not addressed during this session. If the client is diagnosed with a co-occurring substance use disorder, please indicate any changes in the frequency or amount of use: n/a. Stage of change for addressing substance use diagnoses: No substance use/Not applicable    ASSESSMENT:  Cathy Sewell presents with a Euthymic/ normal mood.     her affect is Normal range and intensity, which is congruent, with her mood and the content of the session. The client has made progress on their goals.     Cathy Sewell presents with a none risk of suicide, none risk of self-harm, and none risk of harm to others.    For any risk assessment that surpasses a \"low\" rating, a safety plan must be developed.    A safety plan was indicated: no  If yes, describe in detail n/a    PLAN: Between sessions, Cathy Sewell will utilize positive affirmations. At the next session, the therapist will use Client-centered Therapy and Supportive Psychotherapy to address " her anxiety and stress.    Behavioral Health Treatment Plan and Discharge Planning: Cathy Sewell is aware of and agrees to continue to work on their treatment plan. They have identified and are working toward their discharge goals. yes    Visit start and stop times:    05/13/24  Start Time: 1405  Stop Time: 1455  Total Visit Time: 50 minutes

## 2024-06-03 ENCOUNTER — SOCIAL WORK (OUTPATIENT)
Dept: BEHAVIORAL/MENTAL HEALTH CLINIC | Facility: CLINIC | Age: 24
End: 2024-06-03
Payer: COMMERCIAL

## 2024-06-03 DIAGNOSIS — F50.9 EATING DISORDER, UNSPECIFIED TYPE: ICD-10-CM

## 2024-06-03 DIAGNOSIS — F43.22 ADJUSTMENT DISORDER WITH ANXIOUS MOOD: Primary | ICD-10-CM

## 2024-06-03 PROCEDURE — 90834 PSYTX W PT 45 MINUTES: CPT | Performed by: COUNSELOR

## 2024-06-03 NOTE — PSYCH
"Behavioral Health Psychotherapy Progress Note    Psychotherapy Provided: Individual Psychotherapy     1. Adjustment disorder with anxious mood        2. Eating disorder, unspecified type            Goals addressed in session: Goal 1     DATA: Cathy arrived late to her session today. She spoke about her wedding and how well it went. She identified that she's off from school for 2 more weeks and goes back to rotations. She expressed that she is struggling more with her eating lately and recognized that her menstrual cycle hasn't been regular. She spoke about a time when it was regular and reflected upon how she was eating at that time. Cathy reported the negative thoughts she's had surrounding her eating and the fear she will gain weight and get fat. Provider supported her in challenging these thoughts and incorporating mindfulness eating into her routine. Cathy also identified that she's not hungry and will often not eat because of this. Provider gave her handouts on understanding the matrix of eating.   During this session, this clinician used the following therapeutic modalities: Client-centered Therapy, Cognitive Behavioral Therapy, and Supportive Psychotherapy    Substance Abuse was not addressed during this session. If the client is diagnosed with a co-occurring substance use disorder, please indicate any changes in the frequency or amount of use: n/a. Stage of change for addressing substance use diagnoses: No substance use/Not applicable    ASSESSMENT:  Cathy Sewell presents with a Euthymic/ normal mood.     her affect is Normal range and intensity, which is congruent, with her mood and the content of the session. The client has made progress on their goals.     Cathy Sewell presents with a none risk of suicide, none risk of self-harm, and none risk of harm to others.    For any risk assessment that surpasses a \"low\" rating, a safety plan must be developed.    A safety plan was indicated: no  If yes, " describe in detail n/a    PLAN: Between sessions, Cathy Sewell will Cathy will look through the handouts on the matrix of eating. At the next session, the therapist will use Client-centered Therapy, Cognitive Behavioral Therapy, and Supportive Psychotherapy to address her anxiety and disorganized eating.    Behavioral Health Treatment Plan and Discharge Planning: Cathy Sewell is aware of and agrees to continue to work on their treatment plan. They have identified and are working toward their discharge goals. yes    Visit start and stop times:    06/03/24  Start Time: 1511  Stop Time: 1554  Total Visit Time: 43 minutes

## 2024-07-25 ENCOUNTER — TELEMEDICINE (OUTPATIENT)
Dept: BEHAVIORAL/MENTAL HEALTH CLINIC | Facility: CLINIC | Age: 24
End: 2024-07-25
Payer: COMMERCIAL

## 2024-07-25 DIAGNOSIS — F43.22 ADJUSTMENT DISORDER WITH ANXIOUS MOOD: Primary | ICD-10-CM

## 2024-07-25 DIAGNOSIS — F50.9 EATING DISORDER, UNSPECIFIED TYPE: ICD-10-CM

## 2024-07-25 PROCEDURE — 90834 PSYTX W PT 45 MINUTES: CPT | Performed by: COUNSELOR

## 2024-07-25 NOTE — PSYCH
Virtual Regular Visit    Verification of patient location:    Patient is located at Home in the following state in which I hold an active license [place]      Assessment/Plan:    Problem List Items Addressed This Visit          Behavioral Health    Eating disorder, unspecified    Adjustment disorder with anxious mood - Primary       Goals addressed in session: Goal 1          Reason for visit is   Chief Complaint   Patient presents with    Anxiety    Eating Disorder        Encounter provider Vika Driscoll      Recent Visits  No visits were found meeting these conditions.  Showing recent visits within past 7 days and meeting all other requirements  Today's Visits  Date Type Provider Dept   07/25/24 Telemedicine Vika Driscoll [facility] Therapist [place]   Showing today's visits and meeting all other requirements  Future Appointments  No visits were found meeting these conditions.  Showing future appointments within next 150 days and meeting all other requirements       The patient was identified by name and date of birth. Cathy Sewell was informed that this is a telemedicine visit and that the visit is being conducted throughthe [employer] platform. She agrees to proceed..  My office door was closed. No one else was in the room.  She acknowledged consent and understanding of privacy and security of the video platform. The patient has agreed to participate and understands they can discontinue the visit at any time.    Patient is aware this is a billable service.         HPI     Past Medical History:   Diagnosis Date    Allergic 2005    Irregular periods 6/29/2015    Personal history of multiple concussions     x2    Sprained ankle        Past Surgical History:   Procedure Laterality Date    ROOT CANAL      WISDOM TOOTH EXTRACTION         No current outpatient medications on file.     No current facility-administered medications for this visit.        Allergies   Allergen Reactions    Dog Epithelium (Canis  Lupus Familiaris) Itching and Nasal Congestion    Dust Mite Extract Allergic Rhinitis    Gluten Meal - Food Allergy Diarrhea and GI Intolerance       Review of Systems    Video Exam    There were no vitals filed for this visit.    Physical Exam     Behavioral Health Psychotherapy Progress Note    Psychotherapy Provided: Individual Psychotherapy     1. Adjustment disorder with anxious mood        2. Eating disorder, unspecified type            Goals addressed in session: Goal 1     DATA: Cathy arrived for her individual virtual session today. 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 She processed through the different rotations and what she's been enjoying and what she hasn't been enjoying. She expressed that she's been traveling a lot and hasn't been having time to recharge as whenever she has days off she's been going to different places. Cathy expressed that she's had an increase in anxiety with some mind racing. She has been utilizing prayer and wants to start implementing meditation as she recognizes how important this can be. She identified that she struggles when things slow down with school and always feels that she needs to be doing thing. We discussed what it could look like to slow down and how she can practice this by implementing meditation more. Cathy identified that she's been struggling with her self-esteem. We processed and explored this regarding her rotations and school work.   During this session, this clinician used the following therapeutic modalities: Client-centered Therapy and Supportive Psychotherapy    Substance Abuse was not addressed during this session. If the client is diagnosed with a co-occurring substance use disorder, please indicate any changes in the frequency or amount of use: n/a. Stage of change for addressing substance use diagnoses: No substance use/Not applicable    ASSESSMENT:  Cathy Sewell presents with a Euthymic/ normal  "mood.     her affect is Normal range and intensity, which is congruent, with her mood and the content of the session. The client has made progress on their goals.     Cathy Sewell presents with a none risk of suicide, none risk of self-harm, and none risk of harm to others.    For any risk assessment that surpasses a \"low\" rating, a safety plan must be developed.    A safety plan was indicated: no  If yes, describe in detail n/a    PLAN: Between sessions, Cathy Sewell will journal utilizing the worksheet sent to her and spend time utilizing self-care/relaxation/mediation. At the next session, the therapist will use Client-centered Therapy and Supportive Psychotherapy to address her anxiety .    Behavioral Health Treatment Plan and Discharge Planning: Cathy Sewell is aware of and agrees to continue to work on their treatment plan. They have identified and are working toward their discharge goals. yes    Visit start and stop times:    07/25/24  Start Time: 1708  Stop Time: 1750  Total Visit Time: 42 minutes        "

## 2024-09-08 ENCOUNTER — APPOINTMENT (OUTPATIENT)
Dept: LAB | Facility: CLINIC | Age: 24
End: 2024-09-08
Payer: COMMERCIAL

## 2024-09-08 ENCOUNTER — TRANSCRIBE ORDERS (OUTPATIENT)
Dept: LAB | Facility: CLINIC | Age: 24
End: 2024-09-08

## 2024-09-08 DIAGNOSIS — N92.6 IRREGULAR MENSTRUAL CYCLE: Primary | ICD-10-CM

## 2024-09-08 DIAGNOSIS — N92.6 IRREGULAR MENSTRUAL CYCLE: ICD-10-CM

## 2024-09-08 LAB
ALBUMIN SERPL BCG-MCNC: 4.4 G/DL (ref 3.5–5)
ALP SERPL-CCNC: 44 U/L (ref 34–104)
ALT SERPL W P-5'-P-CCNC: 28 U/L (ref 7–52)
ANION GAP SERPL CALCULATED.3IONS-SCNC: 9 MMOL/L (ref 4–13)
AST SERPL W P-5'-P-CCNC: 35 U/L (ref 13–39)
BASOPHILS # BLD AUTO: 0.03 THOUSANDS/ÂΜL (ref 0–0.1)
BASOPHILS NFR BLD AUTO: 0 % (ref 0–1)
BILIRUB SERPL-MCNC: 0.94 MG/DL (ref 0.2–1)
BUN SERPL-MCNC: 14 MG/DL (ref 5–25)
CALCIUM SERPL-MCNC: 9.2 MG/DL (ref 8.4–10.2)
CHLORIDE SERPL-SCNC: 103 MMOL/L (ref 96–108)
CO2 SERPL-SCNC: 28 MMOL/L (ref 21–32)
CREAT SERPL-MCNC: 0.93 MG/DL (ref 0.6–1.3)
EOSINOPHIL # BLD AUTO: 0.02 THOUSAND/ÂΜL (ref 0–0.61)
EOSINOPHIL NFR BLD AUTO: 0 % (ref 0–6)
ERYTHROCYTE [DISTWIDTH] IN BLOOD BY AUTOMATED COUNT: 12 % (ref 11.6–15.1)
EST. AVERAGE GLUCOSE BLD GHB EST-MCNC: 105 MG/DL
FSH SERPL-ACNC: 4.6 MIU/ML
GFR SERPL CREATININE-BSD FRML MDRD: 86 ML/MIN/1.73SQ M
GLUCOSE P FAST SERPL-MCNC: 75 MG/DL (ref 65–99)
HBA1C MFR BLD: 5.3 %
HCT VFR BLD AUTO: 37.1 % (ref 34.8–46.1)
HGB BLD-MCNC: 12 G/DL (ref 11.5–15.4)
IMM GRANULOCYTES # BLD AUTO: 0.01 THOUSAND/UL (ref 0–0.2)
IMM GRANULOCYTES NFR BLD AUTO: 0 % (ref 0–2)
LH SERPL-ACNC: 5.5 MIU/ML
LYMPHOCYTES # BLD AUTO: 0.8 THOUSANDS/ÂΜL (ref 0.6–4.47)
LYMPHOCYTES NFR BLD AUTO: 11 % (ref 14–44)
MCH RBC QN AUTO: 30.2 PG (ref 26.8–34.3)
MCHC RBC AUTO-ENTMCNC: 32.3 G/DL (ref 31.4–37.4)
MCV RBC AUTO: 93 FL (ref 82–98)
MONOCYTES # BLD AUTO: 0.59 THOUSAND/ÂΜL (ref 0.17–1.22)
MONOCYTES NFR BLD AUTO: 8 % (ref 4–12)
NEUTROPHILS # BLD AUTO: 5.59 THOUSANDS/ÂΜL (ref 1.85–7.62)
NEUTS SEG NFR BLD AUTO: 81 % (ref 43–75)
NRBC BLD AUTO-RTO: 0 /100 WBCS
PLATELET # BLD AUTO: 219 THOUSANDS/UL (ref 149–390)
PMV BLD AUTO: 9.4 FL (ref 8.9–12.7)
POTASSIUM SERPL-SCNC: 3.9 MMOL/L (ref 3.5–5.3)
PROLACTIN SERPL-MCNC: 5.88 NG/ML (ref 3.34–26.72)
PROT SERPL-MCNC: 6.6 G/DL (ref 6.4–8.4)
RBC # BLD AUTO: 3.98 MILLION/UL (ref 3.81–5.12)
SODIUM SERPL-SCNC: 140 MMOL/L (ref 135–147)
T4 FREE SERPL-MCNC: 0.69 NG/DL (ref 0.61–1.12)
TESTOST SERPL-MSCNC: 78 NG/DL
TSH SERPL DL<=0.05 MIU/L-ACNC: 1.39 UIU/ML (ref 0.45–4.5)
WBC # BLD AUTO: 7.04 THOUSAND/UL (ref 4.31–10.16)

## 2024-09-08 PROCEDURE — 83001 ASSAY OF GONADOTROPIN (FSH): CPT

## 2024-09-08 PROCEDURE — 84403 ASSAY OF TOTAL TESTOSTERONE: CPT

## 2024-09-08 PROCEDURE — 83002 ASSAY OF GONADOTROPIN (LH): CPT

## 2024-09-08 PROCEDURE — 85025 COMPLETE CBC W/AUTO DIFF WBC: CPT

## 2024-09-08 PROCEDURE — 36415 COLL VENOUS BLD VENIPUNCTURE: CPT

## 2024-09-08 PROCEDURE — 83036 HEMOGLOBIN GLYCOSYLATED A1C: CPT

## 2024-09-08 PROCEDURE — 84146 ASSAY OF PROLACTIN: CPT

## 2024-09-08 PROCEDURE — 80053 COMPREHEN METABOLIC PANEL: CPT

## 2024-09-08 PROCEDURE — 84439 ASSAY OF FREE THYROXINE: CPT

## 2024-09-08 PROCEDURE — 82627 DEHYDROEPIANDROSTERONE: CPT

## 2024-09-08 PROCEDURE — 84443 ASSAY THYROID STIM HORMONE: CPT

## 2024-09-09 ENCOUNTER — OFFICE VISIT (OUTPATIENT)
Age: 24
End: 2024-09-09
Payer: COMMERCIAL

## 2024-09-09 VITALS
HEIGHT: 68 IN | OXYGEN SATURATION: 98 % | HEART RATE: 60 BPM | BODY MASS INDEX: 22.43 KG/M2 | RESPIRATION RATE: 16 BRPM | WEIGHT: 148 LBS

## 2024-09-09 DIAGNOSIS — Z00.00 ANNUAL PHYSICAL EXAM: Primary | ICD-10-CM

## 2024-09-09 PROBLEM — S42.024A CLOSED NONDISPLACED FRACTURE OF SHAFT OF RIGHT CLAVICLE: Status: RESOLVED | Noted: 2023-08-08 | Resolved: 2024-09-09

## 2024-09-09 LAB — DHEA-S SERPL-MCNC: 416 UG/DL (ref 110–431.7)

## 2024-09-09 PROCEDURE — 99395 PREV VISIT EST AGE 18-39: CPT | Performed by: INTERNAL MEDICINE

## 2024-09-09 NOTE — PROGRESS NOTES
Adult Annual Physical  Name: Cathy Sewell      : 2000      MRN: [id number]  Encounter Provider: Irma Oquendo DO  Encounter Date: 2024   Encounter department: [facility] INTERNAL MEDICINE    Assessment & Plan   1. Annual physical exam    Immunizations and preventive care screenings were discussed with patient today. Appropriate education was printed on patient's after visit summary.    Counseling:  Dental Health: discussed importance of regular tooth brushing, flossing, and dental visits.  Exercise: the importance of regular exercise/physical activity was discussed. Recommend exercise 3-5 times per week for at least 30 minutes.   Immunizations discussed.  Recommend yearly influenza vaccine, updated COVID and HPV series.  Reports she is up to date withTdap  Cancer screenings discussed.  PAP per GYN.         History of Present Illness     Adult Annual Physical:  Patient presents for annual physical.   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 She likes procedures, thinking of Derm or IR residency..     Diet and Physical Activity:  - Diet/Nutrition: well balanced diet and heart healthy (low sodium) diet.  - Exercise:. running    Depression Screening:  - PHQ-2 Score: 0    General Health:  - Sleep: sleeps well.  - Hearing: normal hearing bilateral ears.  - Vision: goes for regular eye exams.  - Dental: regular dental visits.    /GYN Health:  - Follows with GYN: yes.   - Menopause: premenopausal.   - Contraception:. having more frequent cycles 40-60d.      Review of Systems   HENT:  Negative for congestion, postnasal drip and rhinorrhea.    Respiratory:  Negative for cough, chest tightness, shortness of breath and wheezing.    Cardiovascular:  Negative for chest pain, palpitations and leg swelling.   Gastrointestinal:  Negative for abdominal pain, diarrhea and nausea.   Genitourinary:  Positive for menstrual problem. Negative for difficulty urinating.   Musculoskeletal:  Negative for arthralgias.   Neurological:  " Negative for dizziness and headaches.   Psychiatric/Behavioral:  Negative for dysphoric mood and sleep disturbance.      Medical History Reviewed by provider this encounter:  Tobacco  Allergies  Meds  Problems  Med Hx  Surg Hx  Fam Hx       No current outpatient medications on file prior to visit.     No current facility-administered medications on file prior to visit.      Social History     Tobacco Use   • Smoking status: Never   • Smokeless tobacco: Never   Vaping Use   • Vaping status: Never Used   Substance and Sexual Activity   • Alcohol use: Not Currently     Alcohol/week: 1.0 standard drink of alcohol     Types: 1 Standard drinks or equivalent per week     Comment: not consistently; once every couple weeks   • Drug use: Never   • Sexual activity: Never       Objective     Pulse 60   Resp 16   Ht 5' 8\" (1.727 m)   Wt 67.1 kg (148 lb)   SpO2 98%   BMI 22.50 kg/m²     Physical Exam  Vitals reviewed.   Constitutional:       General: She is not in acute distress.  HENT:      Head: Normocephalic.      Right Ear: Tympanic membrane and ear canal normal.      Left Ear: Tympanic membrane and ear canal normal.      Nose: Nose normal.      Mouth/Throat:      Mouth: Mucous membranes are moist.   Eyes:      Extraocular Movements: Extraocular movements intact.      Conjunctiva/sclera: Conjunctivae normal.      Pupils: Pupils are equal, round, and reactive to light.   Neck:      Thyroid: No thyromegaly or thyroid tenderness.   Cardiovascular:      Rate and Rhythm: Normal rate and regular rhythm.      Pulses: Normal pulses.      Heart sounds: Normal heart sounds.   Pulmonary:      Effort: Pulmonary effort is normal. No respiratory distress.      Breath sounds: Normal breath sounds. No wheezing.   Abdominal:      General: Bowel sounds are normal. There is no distension.      Palpations: Abdomen is soft.      Tenderness: There is no abdominal tenderness.   Musculoskeletal:      Cervical back: Neck supple. No " tenderness.      Right lower leg: No edema.      Left lower leg: No edema.   Lymphadenopathy:      Cervical: No cervical adenopathy.   Skin:     Coloration: Skin is not pale.   Neurological:      Mental Status: She is alert and oriented to person, place, and time.   Psychiatric:         Mood and Affect: Mood normal.

## 2024-09-09 NOTE — PATIENT INSTRUCTIONS
"Patient Education     Routine physical for adults   The Basics   Written by the doctors and editors at [facility]   What is a physical? -- A physical is a routine visit, or \"check-up,\" with your doctor. You might also hear it called a \"wellness visit\" or \"preventive visit.\"  During each visit, the doctor will:   Ask about your physical and mental health   Ask about your habits, behaviors, and lifestyle   Do an exam   Give you vaccines if needed   Talk to you about any medicines you take   Give advice about your health   Answer your questions  Getting regular check-ups is an important part of taking care of your health. It can help your doctor find and treat any problems you have. But it's also important for preventing health problems.  A routine physical is different from a \"sick visit.\" A sick visit is when you see a doctor because of a health concern or problem. Since physicals are scheduled ahead of time, you can think about what you want to ask the doctor.  How often should I get a physical? -- It depends on your age and health. In general, for people age 21 years and older:   If you are younger than 50 years, you might be able to get a physical every 3 years.   If you are 50 years or older, your doctor might recommend a physical every year.  If you have an ongoing health condition, like diabetes or high blood pressure, your doctor will probably want to see you more often.  What happens during a physical? -- In general, each visit will include:   Physical exam - The doctor or nurse will check your height, weight, heart rate, and blood pressure. They will also look at your eyes and ears. They will ask about how you are feeling and whether you have any symptoms that bother you.   Medicines - It's a good idea to bring a list of all the medicines you take to each doctor visit. Your doctor will talk to you about your medicines and answer any questions. Tell them if you are having any side effects that bother you. You " "should also tell them if you are having trouble paying for any of your medicines.   Habits and behaviors - This includes:   Your diet   Your exercise habits   Whether you smoke, drink alcohol, or use drugs   Whether you are sexually active   Whether you feel safe at home  Your doctor will talk to you about things you can do to improve your health and lower your risk of health problems. They will also offer help and support. For example, if you want to quit smoking, they can give you advice and might prescribe medicines. If you want to improve your diet or get more physical activity, they can help you with this, too.   Lab tests, if needed - The tests you get will depend on your age and situation. For example, your doctor might want to check your:   Cholesterol   Blood sugar   Iron level   Vaccines - The recommended vaccines will depend on your age, health, and what vaccines you already had. Vaccines are very important because they can prevent certain serious or deadly infections.   Discussion of screening - \"Screening\" means checking for diseases or other health problems before they cause symptoms. Your doctor can recommend screening based on your age, risk, and preferences. This might include tests to check for:   Cancer, such as breast, prostate, cervical, ovarian, colorectal, prostate, lung, or skin cancer   Sexually transmitted infections, such as chlamydia and gonorrhea   Mental health conditions like depression and anxiety  Your doctor will talk to you about the different types of screening tests. They can help you decide which screenings to have. They can also explain what the results might mean.   Answering questions - The physical is a good time to ask the doctor or nurse questions about your health. If needed, they can refer you to other doctors or specialists, too.  Adults older than 65 years often need other care, too. As you get older, your doctor will talk to you about:   How to prevent falling at " home   Hearing or vision tests   Memory testing   How to take your medicines safely   Making sure that you have the help and support you need at home  All topics are updated as new evidence becomes available and our peer review process is complete.  This topic retrieved from [employer] on: May 02, 2024.  Topic 254577 Version 1.0  Release: 32.4.3 - C32.122  © 2024 UpToDate, Inc. and/or its affiliates. All rights reserved.  Consumer Information Use and Disclaimer   Disclaimer: This generalized information is a limited summary of diagnosis, treatment, and/or medication information. It is not meant to be comprehensive and should be used as a tool to help the user understand and/or assess potential diagnostic and treatment options. It does NOT include all information about conditions, treatments, medications, side effects, or risks that may apply to a specific patient. It is not intended to be medical advice or a substitute for the medical advice, diagnosis, or treatment of a health care provider based on the health care provider's examination and assessment of a patient's specific and unique circumstances. Patients must speak with a health care provider for complete information about their health, medical questions, and treatment options, including any risks or benefits regarding use of medications. This information does not endorse any treatments or medications as safe, effective, or approved for treating a specific patient. UpToDate, Inc. and its affiliates disclaim any warranty or liability relating to this information or the use thereof.The use of this information is governed by the Terms of Use, available at https://www.woltersFullStoryuwer.com/en/know/clinical-effectiveness-terms. 2024© UpToDate, Inc. and its affiliates and/or licensors. All rights reserved.  Copyright   © 2024 UpToDate, Inc. and/or its affiliates. All rights reserved.

## 2024-09-19 ENCOUNTER — ANNUAL EXAM (OUTPATIENT)
Dept: OBGYN CLINIC | Facility: CLINIC | Age: 24
End: 2024-09-19
Payer: COMMERCIAL

## 2024-09-19 VITALS
BODY MASS INDEX: 22.35 KG/M2 | WEIGHT: 142.4 LBS | HEIGHT: 67 IN | DIASTOLIC BLOOD PRESSURE: 60 MMHG | SYSTOLIC BLOOD PRESSURE: 100 MMHG

## 2024-09-19 DIAGNOSIS — N92.6 IRREGULAR MENSTRUATION: ICD-10-CM

## 2024-09-19 DIAGNOSIS — Z01.419 ENCOUNTER FOR GYNECOLOGICAL EXAMINATION (GENERAL) (ROUTINE) WITHOUT ABNORMAL FINDINGS: Primary | ICD-10-CM

## 2024-09-19 PROCEDURE — S0612 ANNUAL GYNECOLOGICAL EXAMINA: HCPCS | Performed by: STUDENT IN AN ORGANIZED HEALTH CARE EDUCATION/TRAINING PROGRAM

## 2024-09-19 NOTE — ASSESSMENT & PLAN NOTE
-pap: 9/14/23 NILM   -LMP: 9/4/24  -sexually active, denies dyspareunia, natural family planning for contraception  -STD testing: denies  -smoking: denies   -drinks alcohol socially  -recommend 30 min of exercise daily   -denies family history of ovarian, breast, colon cancer  -return in one year or earlier if needed

## 2024-09-19 NOTE — ASSESSMENT & PLAN NOTE
"-history of amenorrhea for many years, is seeing a \"natural\" OBGYN who is providing her supplements to help get her menses. She reports over the last few months with lifestyle changes, weight gain, and the supplement, she gets her period every 30-70 days.   -pt is not interested in OCPs for cycle control at this time as she wants to get regular menses to help when she is ready to conceive.   -discussed importance of having a menses every 3 months, pt has provera to take if she does not get her menses at that time. Will call if she needs refills.          "

## 2024-09-19 NOTE — PROGRESS NOTES
"Ambulatory Visit  Name: Cathy Sewell      : 2000      MRN: [id number]  Encounter Provider: Selene Martinez DO  Encounter Date: 2024   Encounter department: [facility] [place]    Assessment & Plan  Encounter for gynecological examination (general) (routine) without abnormal findings  -pap: 23 NILM   -LMP: 24  -sexually active, denies dyspareunia, natural family planning for contraception  -STD testing: denies  -smoking: denies   -drinks alcohol socially  -recommend 30 min of exercise daily   -denies family history of ovarian, breast, colon cancer  -return in one year or earlier if needed          Irregular menstruation  -history of amenorrhea for many years, is seeing a \"natural\" OBGYN who is providing her supplements to help get her menses. She reports over the last few months with lifestyle changes, weight gain, and the supplement, she gets her period every 30-70 days.   -pt is not interested in OCPs for cycle control at this time as she wants to get regular menses to help when she is ready to conceive.   -discussed importance of having a menses every 3 months, pt has provera to take if she does not get her menses at that time. Will call if she needs refills.            History of Present Illness     Cathy Sewell is a 24 y.o. female G0 LMP 24 presents for annual exam. Has no acute complaints. 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       Review of Systems   Constitutional:  Negative for appetite change, chills, fatigue and fever.   Respiratory:  Negative for cough, chest tightness, shortness of breath and wheezing.    Cardiovascular:  Negative for chest pain, palpitations and leg swelling.   Gastrointestinal:  Negative for abdominal distention, abdominal pain, constipation, diarrhea, nausea and vomiting.   Endocrine: Negative for cold intolerance, heat intolerance and polyuria.   Genitourinary:  Negative for difficulty urinating, dyspareunia, dysuria, genital " "sores, menstrual problem, vaginal bleeding, vaginal discharge and vaginal pain.   Neurological:  Negative for dizziness, weakness, light-headedness and headaches.           Objective     /60 (BP Location: Right arm, Patient Position: Sitting, Cuff Size: Standard)   Ht 5' 6.54\" (1.69 m)   Wt 64.6 kg (142 lb 6.4 oz)   LMP 09/04/2024 (Exact Date)   BMI 22.62 kg/m²     Physical Exam  Constitutional:       General: She is not in acute distress.     Appearance: Normal appearance. She is not ill-appearing.   Cardiovascular:      Rate and Rhythm: Normal rate.   Pulmonary:      Effort: Pulmonary effort is normal. No respiratory distress.   Chest:   Breasts:     Breasts are symmetrical.      Right: Normal. No swelling, bleeding, inverted nipple, mass, nipple discharge, skin change or tenderness.      Left: Normal. No swelling, bleeding, inverted nipple, mass, nipple discharge, skin change or tenderness.   Abdominal:      General: Abdomen is flat. There is no distension.      Palpations: Abdomen is soft.      Tenderness: There is no abdominal tenderness. There is no guarding or rebound.   Genitourinary:     General: Normal vulva.      Exam position: Lithotomy position.      Labia:         Right: No rash, tenderness, lesion or injury.         Left: No rash, tenderness, lesion or injury.       Vagina: Normal. No foreign body. No vaginal discharge, erythema, tenderness, bleeding or lesions.      Cervix: Normal. No cervical motion tenderness, discharge, friability, lesion, erythema, cervical bleeding or eversion.      Uterus: Normal. Not enlarged, not fixed, not tender and no uterine prolapse.       Adnexa: Right adnexa normal and left adnexa normal.        Right: No mass, tenderness or fullness.          Left: No mass, tenderness or fullness.     Musculoskeletal:      Right lower leg: No edema.      Left lower leg: No edema.   Lymphadenopathy:      Upper Body:      Right upper body: No supraclavicular, axillary or " pectoral adenopathy.      Left upper body: No supraclavicular, axillary or pectoral adenopathy.   Neurological:      General: No focal deficit present.      Mental Status: She is alert and oriented to person, place, and time.   Psychiatric:         Mood and Affect: Mood normal.         Behavior: Behavior normal.         Thought Content: Thought content normal.         Judgment: Judgment normal.

## 2024-10-15 ENCOUNTER — TELEPHONE (OUTPATIENT)
Dept: BEHAVIORAL/MENTAL HEALTH CLINIC | Facility: CLINIC | Age: 24
End: 2024-10-15

## 2024-10-15 NOTE — TELEPHONE ENCOUNTER
Called and LVM to pt and informed pt appt today will be cx due to provider have to leave early due to  emergency. Was asked to call office to reschedule.

## 2024-10-16 ENCOUNTER — TELEPHONE (OUTPATIENT)
Dept: BEHAVIORAL/MENTAL HEALTH CLINIC | Facility: CLINIC | Age: 24
End: 2024-10-16

## 2024-10-16 NOTE — TELEPHONE ENCOUNTER
Provider emailed patient to ask them to call in order to reschedule their canceled appointment for yesterday due to provider having a family emergency.     Treatment plan will be late due to the canceled appointment.

## 2024-10-19 PROBLEM — Z01.419 ENCOUNTER FOR GYNECOLOGICAL EXAMINATION (GENERAL) (ROUTINE) WITHOUT ABNORMAL FINDINGS: Status: RESOLVED | Noted: 2024-09-19 | Resolved: 2024-10-19

## 2024-11-08 ENCOUNTER — TELEPHONE (OUTPATIENT)
Dept: BEHAVIORAL/MENTAL HEALTH CLINIC | Facility: CLINIC | Age: 24
End: 2024-11-08

## 2024-11-08 NOTE — TELEPHONE ENCOUNTER
Provider called and left a voicemail asking patient to call back to reschedule therapy appointment. Please schedule upon return.

## 2024-11-26 ENCOUNTER — TELEPHONE (OUTPATIENT)
Dept: PSYCHIATRY | Facility: CLINIC | Age: 24
End: 2024-11-26

## 2024-11-26 NOTE — LETTER
24    Cathy Sewell  : 2000  [address] [place] [id number]        Dear Cathy Sewell :      We are writing to inform you that your last completed therapy appointment with Vika Driscoll LPC was on 24. Your health and follow-up care are important to us. We want to make you aware that you do not have another appointment with Vika Driscoll LPC scheduled. Please call our office at [phone] as soon as possible so we can schedule your appointment and prevent an interruption of your care. If you have already scheduled a follow-up appointment, please disregard.     If we do not hear from you within 10 business days to make a follow up appointment, we will assume you are no longer interested in care here.       Sincerely,      [facility] Support Staff

## 2024-12-19 ENCOUNTER — TELEPHONE (OUTPATIENT)
Dept: PSYCHIATRY | Facility: CLINIC | Age: 24
End: 2024-12-19

## 2024-12-19 ENCOUNTER — DOCUMENTATION (OUTPATIENT)
Dept: BEHAVIORAL/MENTAL HEALTH CLINIC | Facility: CLINIC | Age: 24
End: 2024-12-19

## 2024-12-19 DIAGNOSIS — F43.22 ADJUSTMENT DISORDER WITH ANXIOUS MOOD: Primary | ICD-10-CM

## 2024-12-19 DIAGNOSIS — F50.9 EATING DISORDER, UNSPECIFIED TYPE: ICD-10-CM

## 2024-12-19 NOTE — LETTER
12/19/24       Cathy Sewell   [address]  [place] [address]       Dear Cathy Sewell :    Given no response to multiple outreach attempts, your treatment with Vika Driscoll LPC at [facility] is being closed at this time.    If you wish to return to [facility], you will need to have another initial assessment and intake appointment. Please call [phone] to schedule a new psychiatric intake if you are interested in doing so.      Please follow-up with your primary care provider for continual care.  When you have scheduled an appointment with another agency, please feel free to complete a release of information so that your records can be transferred to your new provider prior to your first appointment.  This will aid with the continuity of your care.      Sincerely,           Vika Driscoll LPC     [facility]        We will continue to provide psychotropic medications and/or emergency counseling as deemed appropriate by clinical staff for 45 days from receipt of this letter.                For a referral to another agency, we would suggest that you contact your primary health care provider or insurance company.   Please see Provider's List of agencies below:    Outpatient Mental Health Adult  [facility].  [address]  [place].  [phone]   Andrew Alvarez MD to [address].  [place]. [phone]   [facility] [address] [place] [address]. [place]. [phone]   Elda Cardoso MD [address] [place] [address]. [place] [phone]   Ok Rosenberg MD, [address] , [place]. [phone]   Outpatient Mental Health Children, Adolescents and Family  [facility] [address]: [address] [place] [address] [phone]  [facility] [address]  [place] [address]  and [place] [address], [address]   [phone]  [facility] ([facility] and [facility])  [address]  [place]  [phone]  Outpatient Mental Health Bengali Speaking  [facility] [address] [place] [address] [phone]  [facility]:   [place] [address] Phone: [phone]  [facility] Outpatient:Marathon Nanette, [address] [place] [address] New Admissions [phone] Local [phone]  [facility]:   [facility] :[address] [place], [place] [address] Phone: [phone]  [facility] Outpatient: [address] [place] [address] Phone: New Admissions [phone] / Local Office [phone]  Drug & Alcohol Services:  [facility]:   [phone] or [phone]  [facility]:  [phone] or [phone]  [facility]:  [phone]  [facility]:  [phone]  [facility]: [phone]    [facility]:   [facility]:  [address] [place] [address]  Phone: [phone]  Critical access hospital CRISIS NUMBERS:    [place]:  [phone]    [place]: [phone] or [phone]    [place] / [place]: [phone]    [place]: [phone]    [place]: [phone]    [place]: [phone])    [place]: [phone]    National Suicide Prevention Hotline:  1-448.319.4313 (TALK)

## 2024-12-19 NOTE — PROGRESS NOTES
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 She has been dealing with restrictive eating in 2020 and has always been an Rastaf[employer] exerciser. These became unhealthy within this past summer. She has also been dealing with some parent issues and got into conflict with her dad.     Course of treatment included : psychoeducation and individual therapy     Progress/Outcome of Treatment Goals (brief summary of course of treatment) Cathy has participated in therapy by learning coping skills to manage her anxiety and eating disorder.     Treatment Complications (if any): cancellations and school schedule    Treatment Progress: good    Current [facility] Psychiatric Provider: n/a    Discharge Medications include: unknown please see chart    Discharge Date: 12/19/2024    Discharge Diagnosis:   1. Adjustment disorder with anxious mood        2. Eating disorder, unspecified type            Criteria for Discharge:  patient has not responded to multiple outreach attempts to schedule appointments.     Aftercare recommendations include (include specific referral names and phone numbers, if appropriate): continue outpatient care should symptoms arise.     Prognosis: good

## 2024-12-24 NOTE — TELEPHONE ENCOUNTER
DISCHARGE LETTER for Vika Driscoll LPC (certified and regular) placed in outgoing mail on 12/24/24.    Article #:  [id number]    Address:  [address] [place] [id number]